# Patient Record
Sex: FEMALE | Race: WHITE | Employment: UNEMPLOYED | URBAN - METROPOLITAN AREA
[De-identification: names, ages, dates, MRNs, and addresses within clinical notes are randomized per-mention and may not be internally consistent; named-entity substitution may affect disease eponyms.]

---

## 2017-01-12 RX ORDER — RANOLAZINE 500 MG/1
TABLET, FILM COATED, EXTENDED RELEASE ORAL
Qty: 60 TAB | Refills: 11 | Status: SHIPPED | OUTPATIENT
Start: 2017-01-12 | End: 2018-01-08 | Stop reason: SDUPTHER

## 2017-01-19 DIAGNOSIS — E10.22 TYPE 1 DIABETES MELLITUS WITH DIABETIC CHRONIC KIDNEY DISEASE, UNSPECIFIED CKD STAGE (HCC): ICD-10-CM

## 2017-01-19 DIAGNOSIS — I25.5 ISCHEMIC CARDIOMYOPATHY: ICD-10-CM

## 2017-01-19 RX ORDER — INSULIN LISPRO 100 [IU]/ML
1 INJECTION, SOLUTION INTRAVENOUS; SUBCUTANEOUS CONTINUOUS
Qty: 6 VIAL | Refills: 5
Start: 2017-01-19

## 2017-01-19 RX ORDER — LISINOPRIL 5 MG/1
5 TABLET ORAL DAILY
Qty: 90 TAB | Refills: 3 | Status: SHIPPED | OUTPATIENT
Start: 2017-01-19 | End: 2017-03-03 | Stop reason: SDUPTHER

## 2017-01-19 RX ORDER — ROSUVASTATIN CALCIUM 10 MG/1
10 TABLET, COATED ORAL
Qty: 90 TAB | Refills: 1 | Status: SHIPPED | OUTPATIENT
Start: 2017-01-19 | End: 2017-03-03 | Stop reason: SDUPTHER

## 2017-01-19 RX ORDER — LANCETS
EACH MISCELLANEOUS
Qty: 1 EACH | Refills: 11 | Status: SHIPPED | OUTPATIENT
Start: 2017-01-19 | End: 2017-08-21

## 2017-01-25 ENCOUNTER — OFFICE VISIT (OUTPATIENT)
Dept: INTERNAL MEDICINE CLINIC | Age: 57
End: 2017-01-25

## 2017-01-25 VITALS
OXYGEN SATURATION: 99 % | HEIGHT: 66 IN | BODY MASS INDEX: 23.56 KG/M2 | DIASTOLIC BLOOD PRESSURE: 80 MMHG | HEART RATE: 88 BPM | WEIGHT: 146.6 LBS | RESPIRATION RATE: 16 BRPM | TEMPERATURE: 97.7 F | SYSTOLIC BLOOD PRESSURE: 156 MMHG

## 2017-01-25 DIAGNOSIS — E03.9 UNSPECIFIED HYPOTHYROIDISM: ICD-10-CM

## 2017-01-25 DIAGNOSIS — E78.5 DYSLIPIDEMIA: ICD-10-CM

## 2017-01-25 DIAGNOSIS — I25.10 CORONARY ARTERY DISEASE INVOLVING NATIVE CORONARY ARTERY OF NATIVE HEART WITHOUT ANGINA PECTORIS: Chronic | ICD-10-CM

## 2017-01-25 DIAGNOSIS — N18.30 TYPE 1 DIABETES MELLITUS WITH STAGE 3 CHRONIC KIDNEY DISEASE (HCC): Primary | Chronic | ICD-10-CM

## 2017-01-25 DIAGNOSIS — E03.4 HYPOTHYROIDISM DUE TO ACQUIRED ATROPHY OF THYROID: ICD-10-CM

## 2017-01-25 DIAGNOSIS — I25.5 ISCHEMIC CARDIOMYOPATHY: ICD-10-CM

## 2017-01-25 DIAGNOSIS — E10.22 TYPE 1 DIABETES MELLITUS WITH STAGE 3 CHRONIC KIDNEY DISEASE (HCC): Primary | Chronic | ICD-10-CM

## 2017-01-25 RX ORDER — LANOLIN ALCOHOL/MO/W.PET/CERES
1000 CREAM (GRAM) TOPICAL DAILY
COMMUNITY
End: 2017-03-23 | Stop reason: ALTCHOICE

## 2017-01-25 RX ORDER — CHOLECALCIFEROL (VITAMIN D3) 125 MCG
CAPSULE ORAL DAILY
COMMUNITY

## 2017-01-25 RX ORDER — LEVOTHYROXINE SODIUM 175 UG/1
175 TABLET ORAL DAILY
Qty: 90 TAB | Refills: 1 | Status: SHIPPED | OUTPATIENT
Start: 2017-01-25 | End: 2017-04-19 | Stop reason: SDUPTHER

## 2017-01-25 NOTE — PROGRESS NOTES
HISTORY OF PRESENT ILLNESS  Mayda Nayak is a 61 y.o. female. HPI     Pt has not been able to see her endocrinologist because she had [employer] and they did not take her insurance. Pt states that she has been able to take all of her medications except Levothyroxine. Her new insurance started at the beginning of this month. Diabetic Review of Systems - medication compliance: compliant all of the time, home glucose monitoring: is performed regularly, further diabetic ROS: no polyuria or polydipsia, no chest pain, dyspnea or TIA's, no numbness, tingling or pain in extremities. Other symptoms and concerns: She has an insulin pump. Pt states that her sugars have been as high as 280 and she adjusts her pump accordingly. Pt states her legs have been numb up to her knees for years and her finger tips are numb when she wakes up in the morning. Her last eye exam was performed in 02/2016 with Dr. Andre Friedman. She is not seeing a kidney specialist.     CAD- pt is followed by Dr. Grayson Mead. Pt states she has occasional chest tightness although she rests and it resolves like cardiologist told her to. Pt will have echo in 03/2017. Pt denies increased swelling in BLE and not waking up short of breath at night. She has not needed to use Nitro. Hypothyroidism:  Lab Results   Component Value Date/Time    TSH 0.19 09/22/2014 05:00 AM   Thyroid ROS: denies fatigue, weight changes, heat/cold intolerance, bowel/skin changes or CVS symptoms. Pt has not been able to take Levothyroxine for the past 6 weeks. Reports she has gained 10 pounds. Hyperlipidemia:  Cardiovascular risk analysis - 61 y.o. female LDL goal is under 100. ROS: taking medications as instructed, no medication side effects noted, no TIA's, no chest pain on exertion, no dyspnea on exertion, no swelling of ankles. Tolerating meds, no myalgias or other side effects noted  New concerns: Stable. Reports she has chronic constipation.  Pt denies bloody or darker colored stools. Review of Systems   All other systems reviewed and are negative. Physical Exam   Constitutional: She is oriented to person, place, and time. She appears well-developed and well-nourished. HENT:   Head: Normocephalic and atraumatic. Right Ear: External ear normal.   Left Ear: External ear normal.   Nose: Nose normal.   Mouth/Throat: Oropharynx is clear and moist.   Eyes: Conjunctivae and EOM are normal.   Neck: Normal range of motion. Neck supple. Carotid bruit is not present. No thyroid mass and no thyromegaly present. Cardiovascular: Normal rate, regular rhythm, normal heart sounds and intact distal pulses. Pulmonary/Chest: Effort normal and breath sounds normal.   Abdominal: Soft. Bowel sounds are normal.   Musculoskeletal: Normal range of motion. Neurological: She is alert and oriented to person, place, and time. She has normal strength. No cranial nerve deficit or sensory deficit. Coordination normal.   Skin: Skin is warm and dry. No abrasion and no rash noted. Psychiatric: She has a normal mood and affect. Her behavior is normal. Judgment and thought content normal.   Nursing note and vitals reviewed. ASSESSMENT and PLAN  Aylin Huang was seen today for medication evaluation. Diagnoses and all orders for this visit:    Type 1 diabetes mellitus with stage 3 chronic kidney disease (HCC)  Stable- Continue current medications.  -     HEMOGLOBIN A1C WITH EAG  -     MICROALBUMIN, UR, RAND W/ MICROALBUMIN/CREA RATIO    Coronary artery disease involving native coronary artery of native heart without angina pectoris  Stable with occasional chest tightness. She has not needed to take Nitro and should continue current medications. Pt is followed by Dr. Unique Silver. Hypothyroidism due to acquired atrophy of thyroid  Pt has been off of Levothyroxine for 6 weeks.  She will begin taking this again and will check labs although will be off due to lack of medicine.   -     T4, FREE  -     TSH 3RD GENERATION    Ischemic cardiomyopathy  -     METABOLIC PANEL, COMPREHENSIVE    Unspecified hypothyroidism  -     levothyroxine (SYNTHROID) 175 mcg tablet; Take 1 Tab by mouth daily. Dyslipidemia  Stable- Continue current medications.  -     LIPID PANEL    Pt had oatmeal for breakfast and turkey sandwich for lunch although will check labs today anyway. lab results and schedule of future lab studies reviewed with patient  reviewed medications and side effects in detail     Written by Darryle Blood, as dictated by June Toledo MD.     Current diagnosis and concerns discussed with pt at length. Understands risks and benefits or current treatment plan and medications and accepts the treatment and medication with any possible risks. Pt asks appropriate questions which were answered. Pt instructed to call with any concerns or problems.

## 2017-01-26 LAB
ALBUMIN SERPL-MCNC: 4.6 G/DL (ref 3.5–5.5)
ALBUMIN/CREAT UR: 1147.5 MG/G CREAT (ref 0–30)
ALBUMIN/GLOB SERPL: 1.7 {RATIO} (ref 1.1–2.5)
ALP SERPL-CCNC: 106 IU/L (ref 39–117)
ALT SERPL-CCNC: 22 IU/L (ref 0–32)
AST SERPL-CCNC: 27 IU/L (ref 0–40)
BILIRUB SERPL-MCNC: 0.3 MG/DL (ref 0–1.2)
BUN SERPL-MCNC: 42 MG/DL (ref 6–24)
BUN/CREAT SERPL: 29 (ref 9–23)
CALCIUM SERPL-MCNC: 9.6 MG/DL (ref 8.7–10.2)
CHLORIDE SERPL-SCNC: 98 MMOL/L (ref 96–106)
CHOLEST SERPL-MCNC: 421 MG/DL (ref 100–199)
CO2 SERPL-SCNC: 23 MMOL/L (ref 18–29)
COMMENT, 011824: ABNORMAL
CREAT SERPL-MCNC: 1.45 MG/DL (ref 0.57–1)
CREAT UR-MCNC: 61.1 MG/DL
EST. AVERAGE GLUCOSE BLD GHB EST-MCNC: 258 MG/DL
GLOBULIN SER CALC-MCNC: 2.7 G/DL (ref 1.5–4.5)
GLUCOSE SERPL-MCNC: 124 MG/DL (ref 65–99)
HBA1C MFR BLD: 10.6 % (ref 4.8–5.6)
HDLC SERPL-MCNC: 117 MG/DL
INTERPRETATION, 910389: NORMAL
INTERPRETATION: NORMAL
LDLC SERPL CALC-MCNC: 283 MG/DL (ref 0–99)
MICROALBUMIN UR-MCNC: 701.1 UG/ML
POTASSIUM SERPL-SCNC: 4.6 MMOL/L (ref 3.5–5.2)
PROT SERPL-MCNC: 7.3 G/DL (ref 6–8.5)
SODIUM SERPL-SCNC: 139 MMOL/L (ref 134–144)
T4 FREE SERPL-MCNC: 1.56 NG/DL (ref 0.82–1.77)
TRIGL SERPL-MCNC: 106 MG/DL (ref 0–149)
TSH SERPL DL<=0.005 MIU/L-ACNC: 6.3 UIU/ML (ref 0.45–4.5)
VLDLC SERPL CALC-MCNC: 21 MG/DL (ref 5–40)

## 2017-01-31 RX ORDER — BUPROPION HYDROCHLORIDE 100 MG/1
TABLET ORAL
Qty: 60 TAB | Refills: 1 | Status: SHIPPED | OUTPATIENT
Start: 2017-01-31 | End: 2017-03-23 | Stop reason: SDUPTHER

## 2017-02-13 ENCOUNTER — TELEPHONE (OUTPATIENT)
Dept: CARDIOLOGY CLINIC | Age: 57
End: 2017-02-13

## 2017-02-13 NOTE — TELEPHONE ENCOUNTER
MD Heather Ritter MD; Nicky Finney LPN                     Wow! Andre Hauser She needs PCSK9 inhibitor. See she has appt in late March. Will see if we can accelerate this     Maria A Xiomykaren, can we get her sooner to discuss lipids? Appointment scheduled for 2/15/17.

## 2017-02-15 ENCOUNTER — OFFICE VISIT (OUTPATIENT)
Dept: CARDIOLOGY CLINIC | Age: 57
End: 2017-02-15

## 2017-02-15 VITALS
RESPIRATION RATE: 18 BRPM | SYSTOLIC BLOOD PRESSURE: 168 MMHG | BODY MASS INDEX: 22.92 KG/M2 | DIASTOLIC BLOOD PRESSURE: 82 MMHG | OXYGEN SATURATION: 98 % | HEIGHT: 66 IN | HEART RATE: 103 BPM | WEIGHT: 142.6 LBS

## 2017-02-15 DIAGNOSIS — I73.9 PAD (PERIPHERAL ARTERY DISEASE) (HCC): Chronic | ICD-10-CM

## 2017-02-15 DIAGNOSIS — I25.5 ISCHEMIC CARDIOMYOPATHY: ICD-10-CM

## 2017-02-15 DIAGNOSIS — I25.10 CORONARY ARTERY DISEASE INVOLVING NATIVE CORONARY ARTERY OF NATIVE HEART WITHOUT ANGINA PECTORIS: Primary | Chronic | ICD-10-CM

## 2017-02-15 DIAGNOSIS — D50.9 IRON DEFICIENCY ANEMIA, UNSPECIFIED IRON DEFICIENCY ANEMIA TYPE: ICD-10-CM

## 2017-02-15 DIAGNOSIS — N18.30 TYPE 1 DIABETES MELLITUS WITH STAGE 3 CHRONIC KIDNEY DISEASE (HCC): Chronic | ICD-10-CM

## 2017-02-15 DIAGNOSIS — E10.22 TYPE 1 DIABETES MELLITUS WITH STAGE 3 CHRONIC KIDNEY DISEASE (HCC): Chronic | ICD-10-CM

## 2017-02-15 DIAGNOSIS — N18.30 CKD (CHRONIC KIDNEY DISEASE), STAGE III (HCC): Chronic | ICD-10-CM

## 2017-02-15 DIAGNOSIS — E03.4 HYPOTHYROIDISM DUE TO ACQUIRED ATROPHY OF THYROID: ICD-10-CM

## 2017-02-15 DIAGNOSIS — E78.5 DYSLIPIDEMIA: ICD-10-CM

## 2017-02-15 DIAGNOSIS — J44.9 CHRONIC OBSTRUCTIVE PULMONARY DISEASE, UNSPECIFIED COPD TYPE (HCC): Chronic | ICD-10-CM

## 2017-02-15 NOTE — PROGRESS NOTES
History of Present Illness  Lavinia Taylor is a 61 y.o. female. Last seen 5 months ago. Here today to review recent lipid studies. Problem List  Date Reviewed: 1/25/2017          Codes Class Noted    Dyslipidemia ICD-10-CM: E78.5  ICD-9-CM: 272.4  5/31/2016        Hypothyroidism ICD-10-CM: E03.9  ICD-9-CM: 244.9  2/23/2016        Ischemic cardiomyopathy ICD-10-CM: I25.5  ICD-9-CM: 414.8  11/2/2014        CAD (coronary artery disease), native coronary artery (Chronic) ICD-10-CM: I25.10  ICD-9-CM: 414.01  11/2/2014        PAD (peripheral artery disease) (HCC) (Chronic) ICD-10-CM: I73.9  ICD-9-CM: 443.9  10/6/2014        DM type 1 causing renal disease (HCC) (Chronic) ICD-10-CM: E10.29  ICD-9-CM: 250.41  10/6/2014        CKD (chronic kidney disease), stage III (Chronic) ICD-10-CM: N18.3  ICD-9-CM: 585.3  9/25/2014        Anemia, iron deficiency ICD-10-CM: D50.9  ICD-9-CM: 280.9  9/25/2014        COPD (chronic obstructive pulmonary disease) (HCC) (Chronic) ICD-10-CM: J44.9  ICD-9-CM: 358  12/17/2009            Cardiac Testing  ECHO: 9/22/2014: EF 40%, mild diffuse HK, mild MR, TR   Cath 10/14/2014 - EDP 3, no AV gradient, LVEF 40-45%, inferior AK, mod cor Ca2+, LM mild plaque, LAD mild prox plaque, apical 85%, D1 diffuse 75%, LCX mid 90% involving distal OM, RCA prox 100% with L to R collaterals  Carotid duplex 11/5/14 - 0-9% left, 10-49% right  Angio 11/19/14 - patent stents on right with 3 vessel runoff, left mid SFA occlusion with 1 vessel runoff   11/23/15 Echo - akinesis of inferior and inferolateral walls, EF 30-35%  11/23/15 Carotid duplex - 0-9% right internal carotid, 10-49% left internal carotid. No significant change since 11/2014. Echo 2/19/16 - EF 40%. with mild lateral hypokinesis and severe posterior/inferior hypokinesis. Grade 1 diastolic dysfunction. Mild LAE. [facility] 2/22/16 - Proximal LAD 40%, Mid LAD 50%, Distal LAD 90%. HPI  Ms. Lino Zhang denies any significant interval issues.   She notes compliance with all medication, except a 6 week hiatus from LTX due to insurance issues, for which she has now resumed. Occasionally will miss medications due to vomiting in the setting of gastroparesis. She denies any exertional anginal symptoms on current Rx of Ranexa. Notes intermittent issues with chest \"pain\" while laying down for bed. Resolves with sitting up. She denies any dyspnea. Chronic fatigue continues to limit her activities during the day. She continues to wear an insulin pump and denies any recent dietary changes, insulin dose changes or other illnesses. She is on the DASH diet due to gastroparesis. She denies any  palpitations, syncope, orthopnea, edema or paroxysmal nocturnal dyspnea. Current Outpatient Prescriptions   Medication Sig    buPROPion (WELLBUTRIN) 100 mg tablet TAKE ONE TABLET BY MOUTH TWO TIMES A DAY    cyanocobalamin (VITAMIN B-12) 1,000 mcg tablet Take 1,000 mcg by mouth daily.  cholecalciferol, vitamin D3, (VITAMIN D3) 2,000 unit tab Take  by mouth.  levothyroxine (SYNTHROID) 175 mcg tablet Take 1 Tab by mouth daily.  lisinopril (PRINIVIL, ZESTRIL) 5 mg tablet Take 1 Tab by mouth daily.  rosuvastatin (CRESTOR) 10 mg tablet Take 1 Tab by mouth nightly.  insulin lispro (HUMALOG) 100 unit/mL injection 1 Units by SubCUTAneous route continuous. To use for insulin pump.  RANEXA 500 mg SR tablet TAKE 1 TABLET BY MOUTH TWO TIMES A DAY    carvedilol (COREG) 6.25 mg tablet TAKE 1 TABLET BY MOUTH EVERY MORNING & TAKE 2 TABLETS BY MOUTH EVERY EVENING    nitroglycerin (NITROSTAT) 0.4 mg SL tablet 1 Tab by SubLINGual route every five (5) minutes as needed for Chest Pain. Indications: ANGINA    aspirin 81 mg chewable tablet Take 81 mg by mouth daily.   insulin pump (PATIENT SUPPLIED) misc as needed. Humalog insulin pump basal + bolus    Lancets misc To check daily blood sugars.     glucose blood VI test strips (ASCENSIA CONTOUR) strip To test daily blood sugars. No current facility-administered medications for this visit. Social History     Social History    Marital status:      Spouse name: N/A    Number of children: N/A    Years of education: N/A     Occupational History    Not on file. Social History Main Topics    Smoking status: Former Smoker     Packs/day: 0.50     Years: 35.00     Types: Cigarettes     Quit date: 9/21/2014    Smokeless tobacco: Never Used    Alcohol use No    Drug use: No    Sexual activity: No     Other Topics Concern    Not on file     Social History Narrative     Review of Systems  Constitutional: Negative for fever, chills, and diaphoresis. Positive for fatigue. Respiratory: Negative for cough, hemoptysis, sputum production, shortness of breath and wheezing. Cardiovascular: Negative for chest pain, palpitations, orthopnea, claudication, leg swelling and PND. Gastrointestinal: Negative for heartburn, blood in stool and melena. Positive for nausea, vomiting. Genitourinary: Negative for dysuria and flank pain. Musculoskeletal: Negative for joint pain and back pain. Skin: Negative for rash. Neurological: Negative for focal weakness, seizures, loss of consciousness, weakness and headaches. Endo/Heme/Allergies: Does not bruise/bleed easily. Psychiatric/Behavioral: Negative for memory loss. Positive for poor sleep quality.      Visit Vitals    /82 (BP 1 Location: Left arm, BP Patient Position: Sitting)    Pulse (!) 103    Resp 18    Ht 5' 6\" (1.676 m)    Wt 142 lb 9.6 oz (64.7 kg)    SpO2 98%    BMI 23.02 kg/m2     Wt Readings from Last 3 Encounters:   02/15/17 142 lb 9.6 oz (64.7 kg)   01/25/17 146 lb 9.6 oz (66.5 kg)   09/27/16 137 lb (62.1 kg)     Lab Results   Component Value Date/Time    Cholesterol, total 421 01/25/2017 03:09 AM    HDL Cholesterol 117 01/25/2017 03:09 AM    LDL, calculated 283 01/25/2017 03:09 AM    VLDL, calculated 21 01/25/2017 03:09 AM    Triglyceride 106 01/25/2017 03:09 AM    CHOL/HDL Ratio 2.4 02/20/2016 08:25 AM       Physical Exam  General - well developed well nourished in mild distress. Neck - JVP normal, thyroid normal  Cardiac - normal S1,S2, no murmurs, rubs or gallops. No clicks  Vascular - carotids without bruits, radials, femorals and pedal pulses equal bilateral  Lungs - clear to auscultation bilaterals, no rales, wheezing or rhonchi  Abd - soft nontender, no HSM, no abd bruits  Extremities - no edema, warm. Skin - no rash  Neuro - nonfocal  Psych - normal mood and affect    Cardiographics  EKG 10/6/14 - Sinus at 90 bpm, nonspecific ST changes  EKG 10/30/14 - sinus, normal EKG  EKG 2/15/17 - ST     ASSESSMENT and PLAN  Encounter Diagnoses   Name Primary?  Coronary artery disease involving native coronary artery of native heart without angina pectoris Yes    Ischemic cardiomyopathy     PAD (peripheral artery disease) ([facility]     Hypothyroidism due to acquired atrophy of thyroid     Type 1 diabetes mellitus with stage 3 chronic kidney disease (HCC)     Chronic obstructive pulmonary disease, unspecified COPD type ([address]     CKD (chronic kidney disease), stage III     Iron deficiency anemia, unspecified iron deficiency anemia type     Dyslipidemia      Ms. Sheila Ricks has diffuse atherosclerosis involving her coronary, carotid, and lower extremity circulation in the setting of T1DM. She has multivessel CAD with NSTEMI 2/19/16. She is felt to be best suited for continued medical therapy. She has derived benefit from Ranexa 1000 mg BID. Plan to continue her current regimen. Recent repeat lipid panel shows drastic fluctuation of TC, LDL and HDL, suspicious for laboratory error. Plan to repeat lipid panel again today. Continue current statin dose. Phone follow up to review results. Mild LV dysfunction by Echo 2/19/16, EF 40%. No HF sxs on no diuretic regimen. Plan to reassess EF by  Echo in 1 month, as previously scheduled.   Phone follow up to review results. Chronic left leg claudication in the setting of diffuse PAD involving the left iliac circulation. Continue medical therapy. I encouraged her to stay smoke free and to exercise regularly. The patient was encouraged to continue current medications, exercise as she's able and call with any new complaints or concerns. Follow-up Disposition:  Return in about 6 months (around 8/15/2017).      TRISHA Hernández MD

## 2017-02-15 NOTE — MR AVS SNAPSHOT
Visit Information Date & Time Provider Department Dept. Phone Encounter #  
 2/15/2017 10:40 AM Jair Parkinson MD [facility] Gretta Scales [phone] [id number] Follow-up Instructions Return in about 6 months (around 8/15/2017). Your Appointments 3/28/2017  3:00 PM  
ECHO CARDIOGRAMS 2D with ALINE SOUZA  
[facility] ([address]) Appt Note: echo at 3pm 6mo fu dr Vandana Silva [address] [facility]  
[phone]  
  
   
 [address] [facility] [address]  
  
    
 8/21/2017  9:40 AM  
ESTABLISHED PATIENT with Jair Parkinson MD  
[facility] ([address]) Appt Note: echo at 3pm 6mo fu dr Vandana Silva; [address] [facility]  
[address] [address] [address] Upcoming Health Maintenance Date Due Pneumococcal 19-64 Highest Risk (1 of 3 - PCV13) 7/20/1978 FOBT Q 1 YEAR AGE 50-75 9/25/2015 EYE EXAM RETINAL OR DILATED Q1 10/6/2016 BREAST CANCER SCRN MAMMOGRAM 5/21/2017 HEMOGLOBIN A1C Q6M 7/25/2017 FOOT EXAM Q1 1/25/2018 MICROALBUMIN Q1 1/25/2018 LIPID PANEL Q1 1/25/2018 PAP AKA CERVICAL CYTOLOGY 8/6/2018 DTaP/Tdap/Td series (2 - Td) 7/30/2023 Allergies as of 2/15/2017  Review Complete On: 2/15/2017 By: Milena Villarreal NP Severity Noted Reaction Type Reactions Percocet [Oxycodone-acetaminophen]  06/28/2015    Nausea Only ++ Hydrocodone (Norco/vicodin), Morphine, Hydromorphone (Dialudid) are okay per patient ++ Current Immunizations  Reviewed on 10/22/2014 Name Date Influenza Vaccine 11/10/2016, 10/1/2015 Tdap 7/30/2013  8:54 PM  
  
 Not reviewed this visit You Were Diagnosed With   
  
 Codes Comments Coronary artery disease involving native coronary artery of native heart without angina pectoris    -  Primary ICD-10-CM: I25.10 ICD-9-CM: 414.01 Ischemic cardiomyopathy     ICD-10-CM: I25.5 ICD-9-CM: 414.8 PAD (peripheral artery disease) (HCC)     ICD-10-CM: I73.9 ICD-9-CM: 443.9 Hypothyroidism due to acquired atrophy of thyroid     ICD-10-CM: E03.4 ICD-9-CM: 244.8, 246.8 Type 1 diabetes mellitus with stage 3 chronic kidney disease (HCC)     ICD-10-CM: E10.22, N18.3 ICD-9-CM: 250.41, 585.3 Chronic obstructive pulmonary disease, unspecified COPD type ([facility]     ICD-10-CM: J44.9 ICD-9-CM: 441 CKD (chronic kidney disease), stage III     ICD-10-CM: N18.3 ICD-9-CM: 578. 3 Iron deficiency anemia, unspecified iron deficiency anemia type     ICD-10-CM: D50.9 ICD-9-CM: 280.9 Dyslipidemia     ICD-10-CM: E78.5 ICD-9-CM: 272.4 Vitals BP Pulse Resp Height(growth percentile) Weight(growth percentile) SpO2  
 168/82 (BP 1 Location: Left arm, BP Patient Position: Sitting) (!) 103 18 5' 6\" (1.676 m) 142 lb 9.6 oz (64.7 kg) 98% BMI OB Status Smoking Status 23.02 kg/m2 Postmenopausal Former Smoker BMI and BSA Data Body Mass Index Body Surface Area 23.02 kg/m 2 1.74 m 2 Preferred Pharmacy Pharmacy Name Phone [address] [facility] [phone] Your Updated Medication List  
  
   
This list is accurate as of: 2/15/17 11:03 AM.  Always use your most recent med list.  
  
  
  
  
  insulin pump Misc Commonly known as:  PATIENT SUPPLIED  
as needed. Humalog insulin pump basal + bolus  
  
 aspirin 81 mg chewable tablet Take 81 mg by mouth daily. buPROPion 100 mg tablet Commonly known as:  WELLBUTRIN  
TAKE ONE TABLET BY MOUTH TWO TIMES A DAY  
  
 carvedilol 6.25 mg tablet Commonly known as:  COREG  
TAKE 1 TABLET BY MOUTH EVERY MORNING & TAKE 2 TABLETS BY MOUTH EVERY EVENING  
  
 glucose blood VI test strips strip Commonly known as:  Ascensia CONTOUR To test daily blood sugars. insulin lispro 100 unit/mL injection Commonly known as:  HumaLOG  
1 Units by SubCUTAneous route continuous. To use for insulin pump. Lancets Misc To check daily blood sugars. levothyroxine 175 mcg tablet Commonly known as:  SYNTHROID Take 1 Tab by mouth daily. lisinopril 5 mg tablet Commonly known as:  Joseph Bridges Take 1 Tab by mouth daily. nitroglycerin 0.4 mg SL tablet Commonly known as:  NITROSTAT  
1 Tab by SubLINGual route every five (5) minutes as needed for Chest Pain. Indications: ANGINA  
  
 RANEXA 500 mg SR tablet Generic drug:  ranolazine ER  
TAKE 1 TABLET BY MOUTH TWO TIMES A DAY  
  
 rosuvastatin 10 mg tablet Commonly known as:  CRESTOR Take 1 Tab by mouth nightly. VITAMIN B-12 1,000 mcg tablet Generic drug:  cyanocobalamin Take 1,000 mcg by mouth daily. VITAMIN D3 2,000 unit Tab Generic drug:  cholecalciferol (vitamin D3) Take  by mouth. We Performed the Following AMB POC EKG ROUTINE W/ 12 LEADS, INTER & REP [59324 CPT(R)] LIPID PANEL [65412 CPT(R)] Follow-up Instructions Return in about 6 months (around 8/15/2017). Patient Instructions Continue your current medication regimen. I will call you once your lab work is back. Remain observant about your chest pain type symptoms and call me with any questions or concerns. Introducing [facility]! Dear David Marie: Thank you for requesting a [employer] account. Our records indicate that you have previously registered for a [employer] account but its currently inactive. Please call our [employer] support line at [phone]. Additional Information If you have questions, please visit the Frequently Asked Questions section of the [employer] website at [email or web address] Remember, MyChart is NOT to be used for urgent needs. For medical emergencies, dial 911. Now available from your iPhone and Android! Please provide this summary of care documentation to your next provider. Your primary care clinician is listed as Emiliano Rosenthal. If you have any questions after today's visit, please call [phone].

## 2017-02-15 NOTE — PATIENT INSTRUCTIONS
Continue your current medication regimen. I will call you once your lab work is back. Remain observant about your chest pain type symptoms and call me with any questions or concerns.

## 2017-02-16 ENCOUNTER — TELEPHONE (OUTPATIENT)
Dept: INTERNAL MEDICINE CLINIC | Age: 57
End: 2017-02-16

## 2017-02-16 ENCOUNTER — APPOINTMENT (OUTPATIENT)
Dept: GENERAL RADIOLOGY | Age: 57
End: 2017-02-16
Attending: EMERGENCY MEDICINE
Payer: COMMERCIAL

## 2017-02-16 ENCOUNTER — HOSPITAL ENCOUNTER (EMERGENCY)
Age: 57
Discharge: HOME | End: 2017-02-17
Attending: EMERGENCY MEDICINE
Payer: COMMERCIAL

## 2017-02-16 DIAGNOSIS — R11.2 NON-INTRACTABLE VOMITING WITH NAUSEA, UNSPECIFIED VOMITING TYPE: Primary | ICD-10-CM

## 2017-02-16 DIAGNOSIS — R10.84 ABDOMINAL PAIN, GENERALIZED: ICD-10-CM

## 2017-02-16 DIAGNOSIS — K31.84 GASTROPARESIS: ICD-10-CM

## 2017-02-16 LAB
ALBUMIN SERPL BCP-MCNC: 4.4 G/DL (ref 3.5–5)
ALBUMIN/GLOB SERPL: 1.1 {RATIO} (ref 1.1–2.2)
ALP SERPL-CCNC: 93 U/L (ref 45–117)
ALT SERPL-CCNC: 27 U/L (ref 12–78)
ANION GAP BLD CALC-SCNC: 19 MMOL/L (ref 5–15)
APPEARANCE UR: CLEAR
AST SERPL W P-5'-P-CCNC: 21 U/L (ref 15–37)
BACTERIA URNS QL MICRO: NEGATIVE /HPF
BASOPHILS # BLD AUTO: 0 K/UL (ref 0–0.1)
BASOPHILS # BLD: 0 % (ref 0–1)
BILIRUB SERPL-MCNC: 0.5 MG/DL (ref 0.2–1)
BILIRUB UR QL CFM: NEGATIVE
BUN SERPL-MCNC: 58 MG/DL (ref 6–20)
BUN/CREAT SERPL: 35 (ref 12–20)
CALCIUM SERPL-MCNC: 9.3 MG/DL (ref 8.5–10.1)
CHLORIDE SERPL-SCNC: 101 MMOL/L (ref 97–108)
CHOLEST SERPL-MCNC: 242 MG/DL (ref 100–199)
CO2 SERPL-SCNC: 18 MMOL/L (ref 21–32)
COLOR UR: ABNORMAL
CREAT SERPL-MCNC: 1.68 MG/DL (ref 0.55–1.02)
EOSINOPHIL # BLD: 0 K/UL (ref 0–0.4)
EOSINOPHIL NFR BLD: 0 % (ref 0–7)
EPITH CASTS URNS QL MICRO: ABNORMAL /LPF
ERYTHROCYTE [DISTWIDTH] IN BLOOD BY AUTOMATED COUNT: 15.1 % (ref 11.5–14.5)
GLOBULIN SER CALC-MCNC: 3.9 G/DL (ref 2–4)
GLUCOSE BLD STRIP.AUTO-MCNC: 161 MG/DL (ref 65–100)
GLUCOSE SERPL-MCNC: 169 MG/DL (ref 65–100)
GLUCOSE UR STRIP.AUTO-MCNC: NEGATIVE MG/DL
HCT VFR BLD AUTO: 41.3 % (ref 35–47)
HDLC SERPL-MCNC: 91 MG/DL
HGB BLD-MCNC: 13.9 G/DL (ref 11.5–16)
HGB UR QL STRIP: ABNORMAL
HYALINE CASTS URNS QL MICRO: ABNORMAL /LPF (ref 0–5)
INTERPRETATION, 910389: NORMAL
INTERPRETATION: NORMAL
KETONES UR QL STRIP.AUTO: 15 MG/DL
LDLC SERPL CALC-MCNC: 128 MG/DL (ref 0–99)
LEUKOCYTE ESTERASE UR QL STRIP.AUTO: ABNORMAL
LIPASE SERPL-CCNC: 164 U/L (ref 73–393)
LYMPHOCYTES # BLD AUTO: 10 % (ref 12–49)
LYMPHOCYTES # BLD: 1.2 K/UL (ref 0.8–3.5)
MCH RBC QN AUTO: 29.9 PG (ref 26–34)
MCHC RBC AUTO-ENTMCNC: 33.7 G/DL (ref 30–36.5)
MCV RBC AUTO: 88.8 FL (ref 80–99)
MONOCYTES # BLD: 0.7 K/UL (ref 0–1)
MONOCYTES NFR BLD AUTO: 6 % (ref 5–13)
NEUTS SEG # BLD: 9.9 K/UL (ref 1.8–8)
NEUTS SEG NFR BLD AUTO: 84 % (ref 32–75)
NITRITE UR QL STRIP.AUTO: NEGATIVE
PDF IMAGE, 910387: NORMAL
PH UR STRIP: 5 [PH] (ref 5–8)
PLATELET # BLD AUTO: 213 K/UL (ref 150–400)
POTASSIUM SERPL-SCNC: 4.5 MMOL/L (ref 3.5–5.1)
PROT SERPL-MCNC: 8.3 G/DL (ref 6.4–8.2)
PROT UR STRIP-MCNC: 100 MG/DL
RBC # BLD AUTO: 4.65 M/UL (ref 3.8–5.2)
RBC #/AREA URNS HPF: ABNORMAL /HPF (ref 0–5)
SERVICE CMNT-IMP: ABNORMAL
SODIUM SERPL-SCNC: 138 MMOL/L (ref 136–145)
SP GR UR REFRACTOMETRY: 1.02 (ref 1–1.03)
TRIGL SERPL-MCNC: 116 MG/DL (ref 0–149)
UA: UC IF INDICATED,UAUC: ABNORMAL
UROBILINOGEN UR QL STRIP.AUTO: 0.2 EU/DL (ref 0.2–1)
VLDLC SERPL CALC-MCNC: 23 MG/DL (ref 5–40)
WBC # BLD AUTO: 11.8 K/UL (ref 3.6–11)
WBC URNS QL MICRO: ABNORMAL /HPF (ref 0–4)

## 2017-02-16 PROCEDURE — 84484 ASSAY OF TROPONIN QUANT: CPT | Performed by: EMERGENCY MEDICINE

## 2017-02-16 PROCEDURE — 36415 COLL VENOUS BLD VENIPUNCTURE: CPT | Performed by: EMERGENCY MEDICINE

## 2017-02-16 PROCEDURE — 96375 TX/PRO/DX INJ NEW DRUG ADDON: CPT

## 2017-02-16 PROCEDURE — 74011250636 HC RX REV CODE- 250/636: Performed by: EMERGENCY MEDICINE

## 2017-02-16 PROCEDURE — 99285 EMERGENCY DEPT VISIT HI MDM: CPT

## 2017-02-16 PROCEDURE — 85025 COMPLETE CBC W/AUTO DIFF WBC: CPT | Performed by: EMERGENCY MEDICINE

## 2017-02-16 PROCEDURE — 82962 GLUCOSE BLOOD TEST: CPT

## 2017-02-16 PROCEDURE — 81001 URINALYSIS AUTO W/SCOPE: CPT | Performed by: EMERGENCY MEDICINE

## 2017-02-16 PROCEDURE — 83690 ASSAY OF LIPASE: CPT | Performed by: EMERGENCY MEDICINE

## 2017-02-16 PROCEDURE — 80053 COMPREHEN METABOLIC PANEL: CPT | Performed by: EMERGENCY MEDICINE

## 2017-02-16 PROCEDURE — 93005 ELECTROCARDIOGRAM TRACING: CPT

## 2017-02-16 PROCEDURE — 96361 HYDRATE IV INFUSION ADD-ON: CPT

## 2017-02-16 PROCEDURE — 74022 RADEX COMPL AQT ABD SERIES: CPT

## 2017-02-16 PROCEDURE — 80048 BASIC METABOLIC PNL TOTAL CA: CPT | Performed by: EMERGENCY MEDICINE

## 2017-02-16 PROCEDURE — 96374 THER/PROPH/DIAG INJ IV PUSH: CPT

## 2017-02-16 RX ORDER — DIPHENHYDRAMINE HYDROCHLORIDE 50 MG/ML
25 INJECTION, SOLUTION INTRAMUSCULAR; INTRAVENOUS
Status: COMPLETED | OUTPATIENT
Start: 2017-02-16 | End: 2017-02-16

## 2017-02-16 RX ORDER — PROCHLORPERAZINE EDISYLATE 5 MG/ML
10 INJECTION INTRAMUSCULAR; INTRAVENOUS
Status: DISCONTINUED | OUTPATIENT
Start: 2017-02-16 | End: 2017-02-17 | Stop reason: HOSPADM

## 2017-02-16 RX ORDER — PROCHLORPERAZINE EDISYLATE 5 MG/ML
10 INJECTION INTRAMUSCULAR; INTRAVENOUS
Status: COMPLETED | OUTPATIENT
Start: 2017-02-16 | End: 2017-02-16

## 2017-02-16 RX ORDER — ONDANSETRON 2 MG/ML
4 INJECTION INTRAMUSCULAR; INTRAVENOUS
Status: COMPLETED | OUTPATIENT
Start: 2017-02-16 | End: 2017-02-16

## 2017-02-16 RX ADMIN — ONDANSETRON 4 MG: 2 INJECTION INTRAMUSCULAR; INTRAVENOUS at 23:33

## 2017-02-16 RX ADMIN — DIPHENHYDRAMINE HYDROCHLORIDE 25 MG: 50 INJECTION, SOLUTION INTRAMUSCULAR; INTRAVENOUS at 22:17

## 2017-02-16 RX ADMIN — PROCHLORPERAZINE EDISYLATE 10 MG: 5 INJECTION INTRAMUSCULAR; INTRAVENOUS at 22:16

## 2017-02-16 RX ADMIN — SODIUM CHLORIDE 1000 ML: 900 INJECTION, SOLUTION INTRAVENOUS at 22:17

## 2017-02-16 NOTE — TELEPHONE ENCOUNTER
Patient would like a return call on whether she needs to go to the ER or see Dr. Jayant Moore. Blood Sugar is 128 and she says she is vomiting.   Call her back at [phone]

## 2017-02-16 NOTE — TELEPHONE ENCOUNTER
Contacted pt who is advising that the vomiting as stopped, she had it all day yesterday but stopped about hour-hour in a half ago. Inquired if she was keeping any fluids down. Pt advised she had not but until recently. Questioned most recent blood sugar reading. Most recent was 127. She is reluctant to go to ED. Advised that visit to ED was recommended by physician doesn't want to risk being dehydrated or developing more issues. Working on fluid intake if she feels dry, sugars elevate, vomiting returns to be seen in ED. Pt states she will see how she feels in about an hours if no better or vomiting starts she will be seen in ED.

## 2017-02-16 NOTE — ED TRIAGE NOTES
Pt c/o vomiting since yesterday morning. BS in 200's yesterday, but in 100's today. Not keeping anything down, feels dehydrated. No diarrhea, has constipation. No known sick contacts with GI sx. Pt not able to keep any meds down.

## 2017-02-17 VITALS
BODY MASS INDEX: 22.34 KG/M2 | TEMPERATURE: 97.9 F | DIASTOLIC BLOOD PRESSURE: 41 MMHG | WEIGHT: 139 LBS | SYSTOLIC BLOOD PRESSURE: 116 MMHG | OXYGEN SATURATION: 96 % | RESPIRATION RATE: 18 BRPM | HEART RATE: 90 BPM | HEIGHT: 66 IN

## 2017-02-17 LAB
ANION GAP BLD CALC-SCNC: 10 MMOL/L (ref 5–15)
ATRIAL RATE: 101 BPM
BUN SERPL-MCNC: 51 MG/DL (ref 6–20)
BUN/CREAT SERPL: 35 (ref 12–20)
CALCIUM SERPL-MCNC: 7.8 MG/DL (ref 8.5–10.1)
CALCULATED P AXIS, ECG09: 66 DEGREES
CALCULATED R AXIS, ECG10: 70 DEGREES
CALCULATED T AXIS, ECG11: 77 DEGREES
CHLORIDE SERPL-SCNC: 108 MMOL/L (ref 97–108)
CO2 SERPL-SCNC: 23 MMOL/L (ref 21–32)
CREAT SERPL-MCNC: 1.44 MG/DL (ref 0.55–1.02)
DIAGNOSIS, 93000: NORMAL
GLUCOSE SERPL-MCNC: 120 MG/DL (ref 65–100)
P-R INTERVAL, ECG05: 178 MS
POTASSIUM SERPL-SCNC: 4 MMOL/L (ref 3.5–5.1)
Q-T INTERVAL, ECG07: 356 MS
QRS DURATION, ECG06: 80 MS
QTC CALCULATION (BEZET), ECG08: 461 MS
SODIUM SERPL-SCNC: 141 MMOL/L (ref 136–145)
TROPONIN I SERPL-MCNC: <0.04 NG/ML
VENTRICULAR RATE, ECG03: 101 BPM

## 2017-02-17 PROCEDURE — 80048 BASIC METABOLIC PNL TOTAL CA: CPT | Performed by: EMERGENCY MEDICINE

## 2017-02-17 PROCEDURE — 74011250636 HC RX REV CODE- 250/636: Performed by: EMERGENCY MEDICINE

## 2017-02-17 RX ORDER — METOCLOPRAMIDE 10 MG/1
10 TABLET ORAL
Qty: 12 TAB | Refills: 0 | Status: SHIPPED | OUTPATIENT
Start: 2017-02-17 | End: 2017-02-21 | Stop reason: SDUPTHER

## 2017-02-17 RX ADMIN — SODIUM CHLORIDE 1000 ML: 900 INJECTION, SOLUTION INTRAVENOUS at 00:27

## 2017-02-17 NOTE — ED PROVIDER NOTES
HPI Comments: 61 y.o. female with extensive past medical history, please see list, significant for COPD, HTN, DM , NSTEMI, CKD, CAD, gastric pacemaker,  and multiple abdominal surgeries who presents from home with chief complaint of vomiting. Pt states that starting yesterday morning she began vomiting, and then started experiencing chest pressure and then developed abdominal pain. Pt notes that when she first started vomiting, she was vomiting a yellow substance but claims that she is currently vomiting  brown. Pt says that she has taken 2 Zofran at home with some relief. Pt reports that she has a hx of gastric ulcers and gastroparesis. Pt claims that her current sxs do not feel like prior episodes of ulcers or gastroparesis and says that she feels as if she has the flu. Pt also notes that she has some sinus drainage as well. Pt states that her blood glucose level was 127 at 1300 today. Pt reports that she is currently constipated and has not had a BM in the past week, although the pt says that it is normal for her not to have a BM for 2 weeks at a time. Pt's last gastric ulcer was reportedly 1 year ago. Pt denies seeing blood in her vomiting. Pt denies fever, productive cough or sick contacts. There are no other acute medical concerns at this time. PCP: Justice Tracey MD    Note written by Farrah Garcia, as dictated by Nilsa Canchola MD 8:54 PM      The history is provided by the patient. No  was used.         Past Medical History:   Diagnosis Date    (HFpEF) heart failure with preserved ejection fraction ([address] 10/6/2014    CAD (coronary artery disease), native coronary artery 2014    Carotid artery disease ([address]     Carotid artery occlusion and stenosis 10/6/2014    CKD (chronic kidney disease), stage III 2014    COPD (chronic obstructive pulmonary disease) ([address] 2009    DKA (diabetic ketoacidoses) ([address] 2014    DM [facility]diabetes mellitus[facility] 1 [facility]     DM type 1 causing renal disease ([facility] 10/6/2014    Dyslipidemia 2009    Hypertension     Hypothyroid     Hypothyroidism 2016    Ischemic cardiomyopathy 2014    Nicotine dependence     NSTEMI (non-ST elevated myocardial infarction) ([facility] 2014    PAD (peripheral artery disease) ([facility] 10/6/2014    Pneumonia        Past Surgical History:   Procedure Laterality Date    Hx gi       gastric pacemaker    Hx gyn        x2    Hx tonsil and adenoidectomy      Hx tmj arthotomy      Egd  2014          Hx tonsillectomy      Hx cataract removal  2015     right eye    Hx orthopaedic       bilateral knees arthroscopy         Family History:   Problem Relation Age of Onset    Heart Disease Mother     Heart Disease Father        Social History     Social History    Marital status:      Spouse name: N/A    Number of children: N/A    Years of education: N/A     Occupational History    Not on file. Social History Main Topics    Smoking status: Former Smoker     Packs/day: 0.50     Years: 35.00     Types: Cigarettes     Quit date: 2014    Smokeless tobacco: Never Used    Alcohol use No    Drug use: No    Sexual activity: No     Other Topics Concern    Not on file     Social History Narrative         ALLERGIES: Percocet [oxycodone-acetaminophen]    Review of Systems   Constitutional: Negative for fever. HENT: Positive for rhinorrhea. Respiratory: Negative for cough. Cardiovascular: Positive for chest pain (pressure). Gastrointestinal: Positive for abdominal pain, constipation, nausea and vomiting. All other systems reviewed and are negative. Vitals:    17 1649   BP: 141/69   Pulse: 99   Resp: 18   Temp: 97.9 °F (36.6 °C)   SpO2: 98%   Weight: 63 kg (139 lb)   Height: 5' 6.25\" (1.683 m)            Physical Exam   Constitutional: She is oriented to person, place, and time. She appears well-developed and well-nourished. No distress. HENT:   Head: Normocephalic and atraumatic. Eyes: Conjunctivae are normal.   Neck: Normal range of motion. Cardiovascular: Normal rate, regular rhythm, normal heart sounds and intact distal pulses. Exam reveals no friction rub. No murmur heard. Pulmonary/Chest: Effort normal and breath sounds normal. No respiratory distress. She has no wheezes. She has no rales. Abdominal: Soft. Bowel sounds are normal. She exhibits no distension. There is tenderness. There is no rebound and no guarding. Mild epigastric ttp   Musculoskeletal: Normal range of motion. Neurological: She is alert and oriented to person, place, and time. Coordination normal.   Skin: Skin is warm and dry. She is not diaphoretic. No pallor. Psychiatric: She has a normal mood and affect. Her behavior is normal.   Nursing note and vitals reviewed. MDM  Number of Diagnoses or Management Options  Diagnosis management comments: Gastroparesis vs obstipation causing vomiting vs pancreatitis       Amount and/or Complexity of Data Reviewed  Clinical lab tests: reviewed and ordered  Tests in the radiology section of CPT®: ordered and reviewed  Independent visualization of images, tracings, or specimens: yes (ekg)    Patient Progress  Patient progress: stable    ED Course       Procedures    EKG interpretation: (Preliminary)  Rhythm: sinus tachycardia; and regular . Rate (approx.): 100; Axis: normal; P wave: normal; QRS interval: normal ; ST/T wave: non-specific changes    12:13 AM  No vomiting here pt denies drinking ethylene glycol, methanol taking aspirin. Suspect ag acidosis is from dehydration. Blood sugar not elevated. Will recheck after 2 liters of fluid.  Care turned over to dr Dre Bee

## 2017-02-17 NOTE — ED NOTES
Patient appeared to have a reaction possibly to Benadryl. Compazine was being inserted into the IV tubing when patient complained of pain at the IV site. The Compazine was not administered through the line at that time. However the line was flushed, MD will order more compazine to help with nausea and watch for reaction.  IV was flushed with 10cc and redness around the site went away

## 2017-02-17 NOTE — DISCHARGE INSTRUCTIONS
Abdominal Pain: Care Instructions  Your Care Instructions    Abdominal pain has many possible causes. Some aren't serious and get better on their own in a few days. Others need more testing and treatment. If your pain continues or gets worse, you need to be rechecked and may need more tests to find out what is wrong. You may need surgery to correct the problem. Don't ignore new symptoms, such as fever, nausea and vomiting, urination problems, pain that gets worse, and dizziness. These may be signs of a more serious problem. Your doctor may have recommended a follow-up visit in the next 8 to 12 hours. If you are not getting better, you may need more tests or treatment. The doctor has checked you carefully, but problems can develop later. If you notice any problems or new symptoms, get medical treatment right away. Follow-up care is a key part of your treatment and safety. Be sure to make and go to all appointments, and call your doctor if you are having problems. It's also a good idea to know your test results and keep a list of the medicines you take. How can you care for yourself at home? · Rest until you feel better. · To prevent dehydration, drink plenty of fluids, enough so that your urine is light yellow or clear like water. Choose water and other caffeine-free clear liquids until you feel better. If you have kidney, heart, or liver disease and have to limit fluids, talk with your doctor before you increase the amount of fluids you drink. · If your stomach is upset, eat mild foods, such as rice, dry toast or crackers, bananas, and applesauce. Try eating several small meals instead of two or three large ones. · Wait until 48 hours after all symptoms have gone away before you have spicy foods, alcohol, and drinks that contain caffeine. · Do not eat foods that are high in fat. · Avoid anti-inflammatory medicines such as aspirin, ibuprofen (Advil, Motrin), and naproxen (Aleve).  These can cause stomach upset. Talk to your doctor if you take daily aspirin for another health problem. When should you call for help? Call 911 anytime you think you may need emergency care. For example, call if:  · You passed out (lost consciousness). · You pass maroon or very bloody stools. · You vomit blood or what looks like coffee grounds. · You have new, severe belly pain. Call your doctor now or seek immediate medical care if:  · Your pain gets worse, especially if it becomes focused in one area of your belly. · You have a new or higher fever. · Your stools are black and look like tar, or they have streaks of blood. · You have unexpected vaginal bleeding. · You have symptoms of a urinary tract infection. These may include:  ¨ Pain when you urinate. ¨ Urinating more often than usual.  ¨ Blood in your urine. · You are dizzy or lightheaded, or you feel like you may faint. Watch closely for changes in your health, and be sure to contact your doctor if:  · You are not getting better after 1 day (24 hours). Where can you learn more? Go to http://luis enriqueSoocialmelinda.info/. Enter Y679 in the search box to learn more about \"Abdominal Pain: Care Instructions. \"  Current as of: May 27, 2016  Content Version: 11.1  © 9429-2272 [employer]. Care instructions adapted under license by [employer] (which disclaims liability or warranty for this information). If you have questions about a medical condition or this instruction, always ask your healthcare professional. [place] [address] any warranty or liability for your use of this information. Nausea and Vomiting: Care Instructions  Your Care Instructions    When you are nauseated, you may feel weak and sweaty and notice a lot of saliva in your mouth. Nausea often leads to vomiting. Most of the time you do not need to worry about nausea and vomiting, but they can be signs of other illnesses.   Two common causes of nausea and vomiting are stomach flu and food poisoning. Nausea and vomiting from viral stomach flu will usually start to improve within 24 hours. Nausea and vomiting from food poisoning may last from 12 to 48 hours. The doctor has checked you carefully, but problems can develop later. If you notice any problems or new symptoms, get medical treatment right away. Follow-up care is a key part of your treatment and safety. Be sure to make and go to all appointments, and call your doctor if you are having problems. It's also a good idea to know your test results and keep a list of the medicines you take. How can you care for yourself at home? · To prevent dehydration, drink plenty of fluids, enough so that your urine is light yellow or clear like water. Choose water and other caffeine-free clear liquids until you feel better. If you have kidney, heart, or liver disease and have to limit fluids, talk with your doctor before you increase the amount of fluids you drink. · Rest in bed until you feel better. · When you are able to eat, try clear soups, mild foods, and liquids until all symptoms are gone for 12 to 48 hours. Other good choices include dry toast, crackers, cooked cereal, and gelatin dessert, such as Jell-O. When should you call for help? Call 911 anytime you think you may need emergency care. For example, call if:  · You passed out (lost consciousness). Call your doctor now or seek immediate medical care if:  · You have symptoms of dehydration, such as:  ¨ Dry eyes and a dry mouth. ¨ Passing only a little dark urine. ¨ Feeling thirstier than usual.  · You have new or worsening belly pain. · You have a new or higher fever. · You vomit blood or what looks like coffee grounds. Watch closely for changes in your health, and be sure to contact your doctor if:  · You have ongoing nausea and vomiting. · Your vomiting is getting worse. · Your vomiting lasts longer than 2 days.   · You are not getting better as expected. Where can you learn more? Go to http://luis enrique-melinda.info/. Enter 25 577268 in the search box to learn more about \"Nausea and Vomiting: Care Instructions. \"  Current as of: May 27, 2016  Content Version: 11.1  © 1694-4616 [employer]. Care instructions adapted under license by [employer] (which disclaims liability or warranty for this information). If you have questions about a medical condition or this instruction, always ask your healthcare professional. [place] [address] any warranty or liability for your use of this information. Gastroparesis: Care Instructions  Your Care Instructions    When you have gastroparesis, your stomach takes a lot longer to empty. This delay can cause belly pain, bloating, and belching. It also can cause hiccups, heartburn, nausea or vomiting. You may not feel like eating. These symptoms may come and go. They most often occur during and after meals. You may feel full after only a few bites of food. This condition occurs when the nerves to the stomach don't work properly. Diabetes is the most common cause of this nerve damage. Gastroparesis can make it harder to control your blood sugar levels. But keeping your blood sugar levels under control may help with your symptoms. Parkinson's disease, stroke, and some medicines can also cause this condition. Home treatment can often help. Follow-up care is a key part of your treatment and safety. Be sure to make and go to all appointments, and call your doctor if you are having problems. It's also a good idea to know your test results and keep a list of the medicines you take. How can you care for yourself at home? · Eat several small meals each day rather than three large meals. · Eat foods that are low in fiber and fat. · If your doctor suggests it, take medicines that help the stomach empty more quickly. These are called motility agents.   When should you call for help? Call your doctor now or seek immediate medical care if:  · You have new or worse belly pain. · Your belly pain lasts longer than 24 hours and is not getting better. Watch closely for changes in your health, and be sure to contact your doctor if:  · You have digestive problems that get worse or occur more often. · You are losing weight. Where can you learn more? Go to http://luis enrique-melinda.info/. Enter M106 in the search box to learn more about \"Gastroparesis: Care Instructions. \"  Current as of: August 9, 2016  Content Version: 11.1  © 6013-8884 [employer]. Care instructions adapted under license by [employer] (which disclaims liability or warranty for this information). If you have questions about a medical condition or this instruction, always ask your healthcare professional. [address] any warranty or liability for your use of this information. We hope that we have addressed all of your medical concerns. The examination and treatment you received in the Emergency Department were for an emergent problem and were not intended as complete care. It is important that you follow up with your healthcare provider(s) for ongoing care. If your symptoms worsen or do not improve as expected, and you are unable to reach your usual health care provider(s), you should return to the Emergency Department. Today's healthcare is undergoing tremendous change, and patient satisfaction surveys are one of the many tools to assess the quality of medical care. You may receive a survey from the [employer] organization regarding your experience in the Emergency Department. I hope that your experience has been completely positive, particularly the medical care that I provided. As such, please participate in the survey; anything less than excellent does not meet my expectations or intentions.         [facility] and [address] participate in nationally recognized quality of care measures. If your blood pressure is greater than 120/80, as reported below, we urge that you seek medical care to address the potential of high blood pressure, commonly known as hypertension. Hypertension can be hereditary or can be caused by certain medical conditions, pain, stress, or \"white coat syndrome. \"       Please make an appointment with your health care provider(s) for follow up of your Emergency Department visit. VITALS:   Patient Vitals for the past 8 hrs:   Pulse Resp BP SpO2   02/17/17 0230 90 18 116/41 96 %   02/17/17 0130 92 21 140/57 97 %   02/17/17 0030 95 18 138/57 96 %   02/16/17 2330 100 19 140/63 98 %   02/16/17 2230 (!) 103 19 135/57 97 %          Thank you for allowing us to provide you with medical care today. We realize that you have many choices for your emergency care needs. Please choose us in the future for any continued health care needs. Vincenzo Davis [facility], [address] [place] [address] 20.   Office: [phone]            Recent Results (from the past 24 hour(s))   URINALYSIS W/ REFLEX CULTURE    Collection Time: 02/16/17  5:06 PM   Result Value Ref Range    Color YELLOW/STRAW      Appearance CLEAR CLEAR      Specific gravity 1.019 1.003 - 1.030      pH (UA) 5.0 5.0 - 8.0      Protein 100 (A) NEG mg/dL    Glucose NEGATIVE  NEG mg/dL    Ketone 15 (A) NEG mg/dL    Blood SMALL (A) NEG      Urobilinogen 0.2 0.2 - 1.0 EU/dL    Nitrites NEGATIVE  NEG      Leukocyte Esterase SMALL (A) NEG      WBC 5-10 0 - 4 /hpf    RBC 5-10 0 - 5 /hpf    Epithelial cells FEW FEW /lpf    Bacteria NEGATIVE  NEG /hpf    UA:UC IF INDICATED CULTURE NOT INDICATED BY UA RESULT CNI      Hyaline cast 0-2 0 - 5 /lpf   BILIRUBIN, CONFIRM    Collection Time: 02/16/17  5:06 PM   Result Value Ref Range    Bilirubin UA, confirm NEGATIVE  NEG     GLUCOSE, POC    Collection Time: 02/16/17 10:00 PM Result Value Ref Range    Glucose (POC) 161 (H) 65 - 100 mg/dL    Performed by [employer]    CBC WITH AUTOMATED DIFF    Collection Time: 02/16/17 10:14 PM   Result Value Ref Range    WBC 11.8 (H) 3.6 - 11.0 K/uL    RBC 4.65 3.80 - 5.20 M/uL    HGB 13.9 11.5 - 16.0 g/dL    HCT 41.3 35.0 - 47.0 %    MCV 88.8 80.0 - 99.0 FL    MCH 29.9 26.0 - 34.0 PG    MCHC 33.7 30.0 - 36.5 g/dL    RDW 15.1 (H) 11.5 - 14.5 %    PLATELET 737 331 - 473 K/uL    NEUTROPHILS 84 (H) 32 - 75 %    LYMPHOCYTES 10 (L) 12 - 49 %    MONOCYTES 6 5 - 13 %    EOSINOPHILS 0 0 - 7 %    BASOPHILS 0 0 - 1 %    ABS. NEUTROPHILS 9.9 (H) 1.8 - 8.0 K/UL    ABS. LYMPHOCYTES 1.2 0.8 - 3.5 K/UL    ABS. MONOCYTES 0.7 0.0 - 1.0 K/UL    ABS. EOSINOPHILS 0.0 0.0 - 0.4 K/UL    ABS. BASOPHILS 0.0 0.0 - 0.1 K/UL   METABOLIC PANEL, COMPREHENSIVE    Collection Time: 02/16/17 10:14 PM   Result Value Ref Range    Sodium 138 136 - 145 mmol/L    Potassium 4.5 3.5 - 5.1 mmol/L    Chloride 101 97 - 108 mmol/L    CO2 18 (L) 21 - 32 mmol/L    Anion gap 19 (H) 5 - 15 mmol/L    Glucose 169 (H) 65 - 100 mg/dL    BUN 58 (H) 6 - 20 MG/DL    Creatinine 1.68 (H) 0.55 - 1.02 MG/DL    BUN/Creatinine ratio 35 (H) 12 - 20      GFR est AA 38 (L) >60 ml/min/1.73m2    GFR est non-AA 31 (L) >60 ml/min/1.73m2    Calcium 9.3 8.5 - 10.1 MG/DL    Bilirubin, total 0.5 0.2 - 1.0 MG/DL    ALT (SGPT) 27 12 - 78 U/L    AST (SGOT) 21 15 - 37 U/L    Alk.  phosphatase 93 45 - 117 U/L    Protein, total 8.3 (H) 6.4 - 8.2 g/dL    Albumin 4.4 3.5 - 5.0 g/dL    Globulin 3.9 2.0 - 4.0 g/dL    A-G Ratio 1.1 1.1 - 2.2     LIPASE    Collection Time: 02/16/17 10:14 PM   Result Value Ref Range    Lipase 164 73 - 393 U/L   TROPONIN I    Collection Time: 02/16/17 11:44 PM   Result Value Ref Range    Troponin-I, Qt. <0.04 <1.74 ng/mL   METABOLIC PANEL, BASIC    Collection Time: 02/17/17  2:10 AM   Result Value Ref Range    Sodium 141 136 - 145 mmol/L    Potassium 4.0 3.5 - 5.1 mmol/L    Chloride 108 97 - 108 mmol/L    CO2 23 21 - 32 mmol/L    Anion gap 10 5 - 15 mmol/L    Glucose 120 (H) 65 - 100 mg/dL    BUN 51 (H) 6 - 20 MG/DL    Creatinine 1.44 (H) 0.55 - 1.02 MG/DL    BUN/Creatinine ratio 35 (H) 12 - 20      GFR est AA 45 (L) >60 ml/min/1.73m2    GFR est non-AA 38 (L) >60 ml/min/1.73m2    Calcium 7.8 (L) 8.5 - 10.1 MG/DL       Xr Abd Acute W 1 V Chest    Result Date: 2/16/2017  EXAM:  XR ABD ACUTE W 1 V CHEST INDICATION:  Abdominal Pain COMPARISON: 2016. FINDINGS: The upright chest radiograph demonstrates clear lungs except for minor areas of atelectasis/scarring. There is no pleural effusion or free air under the diaphragm. Supine and upright views of the abdomen demonstrate a nonobstructive bowel gas pattern. There is no free intraperitoneal air. Atherosclerotic changes are noted with stent in the right iliac artery. Pacer leads overlie upper abdomen. . Bony structures are intact. IMPRESSION: No free air. Gas pattern is within normal limits.

## 2017-02-21 ENCOUNTER — OFFICE VISIT (OUTPATIENT)
Dept: INTERNAL MEDICINE CLINIC | Age: 57
End: 2017-02-21

## 2017-02-21 VITALS
RESPIRATION RATE: 16 BRPM | SYSTOLIC BLOOD PRESSURE: 144 MMHG | DIASTOLIC BLOOD PRESSURE: 72 MMHG | HEIGHT: 66 IN | BODY MASS INDEX: 22.98 KG/M2 | WEIGHT: 143 LBS | TEMPERATURE: 97.5 F | HEART RATE: 80 BPM

## 2017-02-21 DIAGNOSIS — I73.9 PAD (PERIPHERAL ARTERY DISEASE) (HCC): Chronic | ICD-10-CM

## 2017-02-21 DIAGNOSIS — E10.22 TYPE 1 DIABETES MELLITUS WITH STAGE 3 CHRONIC KIDNEY DISEASE (HCC): Chronic | ICD-10-CM

## 2017-02-21 DIAGNOSIS — K31.84 GASTROPARESIS: Primary | ICD-10-CM

## 2017-02-21 DIAGNOSIS — J01.00 ACUTE NON-RECURRENT MAXILLARY SINUSITIS: ICD-10-CM

## 2017-02-21 DIAGNOSIS — I25.5 ISCHEMIC CARDIOMYOPATHY: ICD-10-CM

## 2017-02-21 DIAGNOSIS — E78.5 DYSLIPIDEMIA: ICD-10-CM

## 2017-02-21 DIAGNOSIS — N18.30 TYPE 1 DIABETES MELLITUS WITH STAGE 3 CHRONIC KIDNEY DISEASE (HCC): Chronic | ICD-10-CM

## 2017-02-21 RX ORDER — METOCLOPRAMIDE 10 MG/1
10 TABLET ORAL
Qty: 120 TAB | Refills: 3 | Status: SHIPPED | OUTPATIENT
Start: 2017-02-21 | End: 2017-03-03

## 2017-02-21 RX ORDER — AZITHROMYCIN 250 MG/1
250 TABLET, FILM COATED ORAL SEE ADMIN INSTRUCTIONS
Qty: 6 TAB | Refills: 0 | Status: SHIPPED | OUTPATIENT
Start: 2017-02-21 | End: 2017-02-26

## 2017-02-21 NOTE — PROGRESS NOTES
HISTORY OF PRESENT ILLNESS  Kiah Brocket is a 61 y.o. female. HPI     Pt presented to the ED on 2/17/17 with complaints of vomiting. She reported vomiting and then experiencing chest pressure and abdominal pain. She also stated that she was constipated. She was prescribed Reglan at this time and states she needs a refill. Abdominal x-ray showed no free air and gas pattern was within normal limits. She has history of ulcers and gastroparesis. Today pt states that she is still vomiting a few times/day and she thinks it is related to gastroparesis. Pt thinks that Reglan may be helping although notes she has issues with swallowing pills because they feel like they get stuck in sternal chest region. She also has trouble eating because food feels like it is getting stuck and she needs to throw up. Reports she finally had a BM although it was at first very watery diarrhea. Pt has an appointment with Dr. Dayan Galaviz NP tomorrow. She notes that Dr. Nelson Lennon put a pacemaker in 10 years ago to help stimulate her stomach due to gastroparesis. Diabetic Review of Systems - medication compliance: compliant all of the time, home glucose monitoring: is performed regularly, further diabetic ROS: no polyuria or polydipsia, no chest pain, dyspnea or TIA's, no numbness, tingling or pain in extremities. Other symptoms and concerns: Reports her highest sugar was in the 160's. Fasting sugar was 94 yesterday and 117 this morning. Pt called Dr. Chapincito Leone and is trying to get worked in sooner. Pt denies shortness of breath or chest pain and states she has some chest tightness. Pt is taking Ranexa and her Troponin levels were fine in the ED. Reports she has sinus congestion and yellow mucus. Hyperlipidemia:  Cardiovascular risk analysis - 61 y.o. female LDL goal is under 100.    ROS: taking medications as instructed, no medication side effects noted, no TIA's, no chest pain on exertion, no dyspnea on exertion, no swelling of ankles. Tolerating meds, no myalgias or other side effects noted  New concerns: Her lipids were elevated in 01/2017 and Dr. Kisha Alston was suspicious for laboratory error. Lipids were repeated on 2/15/17 and decreased. She denies a change in her eating habits and states that she has consistently taken Crestor. Review of Systems   Gastrointestinal: Positive for vomiting. All other systems reviewed and are negative. Physical Exam   Constitutional: She is oriented to person, place, and time. She appears well-developed and well-nourished. HENT:   Head: Normocephalic and atraumatic. Right Ear: External ear normal.   Left Ear: External ear normal.   Nose: Nose normal.   Mouth/Throat: Oropharynx is clear and moist.   Eyes: Conjunctivae and EOM are normal.   Neck: Normal range of motion. Neck supple. Carotid bruit is not present. No thyroid mass and no thyromegaly present. Cardiovascular: Normal rate, regular rhythm, S1 normal, S2 normal, normal heart sounds and intact distal pulses. Pulmonary/Chest: Effort normal and breath sounds normal.   Abdominal: Soft. Normal appearance and bowel sounds are normal. There is no hepatosplenomegaly. There is no tenderness. Musculoskeletal: Normal range of motion. Neurological: She is alert and oriented to person, place, and time. She has normal strength. No cranial nerve deficit or sensory deficit. Coordination normal.   Skin: Skin is warm, dry and intact. No abrasion and no rash noted. Psychiatric: She has a normal mood and affect. Her behavior is normal. Judgment and thought content normal.   Nursing note and vitals reviewed. ASSESSMENT and PLAN  Shanta Barone was seen today for hospital follow up. Diagnoses and all orders for this visit:    Gastroparesis  Advised pt that she may have had a GI bug that triggered gastroparesis flare. Pt is still vomiting a few times/day and has difficulty swallowing medications and food.  She will see GI tomorrow and may need to be scoped given her history of ulcers. Pt to continue taking Reglan. She had pacemaker for gastroparesis placed 10 years ago and may need to follow up with Dr. Daryle Better regarding this although first needs to see GI.  -     metoclopramide HCl (REGLAN) 10 mg tablet; Take 1 Tab by mouth every six (6) hours as needed for Nausea for up to 10 days. Type 1 diabetes mellitus with stage 3 chronic kidney disease (HCC)  Stable- Continue current medications. PAD (peripheral artery disease) (HCC)  Stable- Continue current medications. Ischemic cardiomyopathy  Stable- Continue current medications. Pt is followed by Dr. Krystal iSu. Acute non-recurrent maxillary sinusitis  Pt to begin taking Zpak. -     azithromycin (ZITHROMAX) 250 mg tablet; Take 1 Tab by mouth See Admin Instructions for 5 days. Dyslipidemia  Stable- Continue current medications. lab results and schedule of future lab studies reviewed with patient  reviewed medications and side effects in detail     Written by Mile Goncalves, as dictated by Aleja Avila MD.     Current diagnosis and concerns discussed with pt at length. Understands risks and benefits or current treatment plan and medications and accepts the treatment and medication with any possible risks. Pt asks appropriate questions which were answered. Pt instructed to call with any concerns or problems.

## 2017-03-03 DIAGNOSIS — I25.5 ISCHEMIC CARDIOMYOPATHY: ICD-10-CM

## 2017-03-03 RX ORDER — LANCETS
EACH MISCELLANEOUS
Qty: 1 EACH | Refills: 11 | Status: SHIPPED | OUTPATIENT
Start: 2017-03-03 | End: 2018-07-22

## 2017-03-03 RX ORDER — LISINOPRIL 5 MG/1
5 TABLET ORAL DAILY
Qty: 90 TAB | Refills: 0 | Status: SHIPPED | OUTPATIENT
Start: 2017-03-03 | End: 2018-02-07 | Stop reason: SDUPTHER

## 2017-03-03 RX ORDER — ROSUVASTATIN CALCIUM 10 MG/1
10 TABLET, COATED ORAL
Qty: 90 TAB | Refills: 0 | Status: SHIPPED | OUTPATIENT
Start: 2017-03-03 | End: 2017-09-13 | Stop reason: SDUPTHER

## 2017-03-23 ENCOUNTER — HOSPITAL ENCOUNTER (OUTPATIENT)
Dept: GENERAL RADIOLOGY | Age: 57
Discharge: HOME | End: 2017-03-23
Attending: NURSE PRACTITIONER
Payer: COMMERCIAL

## 2017-03-23 ENCOUNTER — OFFICE VISIT (OUTPATIENT)
Dept: INTERNAL MEDICINE CLINIC | Age: 57
End: 2017-03-23

## 2017-03-23 VITALS
TEMPERATURE: 98.5 F | RESPIRATION RATE: 18 BRPM | BODY MASS INDEX: 23.32 KG/M2 | WEIGHT: 145.13 LBS | SYSTOLIC BLOOD PRESSURE: 167 MMHG | OXYGEN SATURATION: 98 % | DIASTOLIC BLOOD PRESSURE: 83 MMHG | HEART RATE: 95 BPM | HEIGHT: 66 IN

## 2017-03-23 DIAGNOSIS — N18.30 TYPE 1 DIABETES MELLITUS WITH STAGE 3 CHRONIC KIDNEY DISEASE (HCC): Chronic | ICD-10-CM

## 2017-03-23 DIAGNOSIS — J40 BRONCHITIS: ICD-10-CM

## 2017-03-23 DIAGNOSIS — J44.1 CHRONIC OBSTRUCTIVE PULMONARY DISEASE WITH ACUTE EXACERBATION (HCC): Chronic | ICD-10-CM

## 2017-03-23 DIAGNOSIS — J40 BRONCHITIS: Primary | ICD-10-CM

## 2017-03-23 DIAGNOSIS — S99.922A FOOT INJURY, LEFT, INITIAL ENCOUNTER: ICD-10-CM

## 2017-03-23 DIAGNOSIS — E10.22 TYPE 1 DIABETES MELLITUS WITH STAGE 3 CHRONIC KIDNEY DISEASE (HCC): Chronic | ICD-10-CM

## 2017-03-23 PROCEDURE — 71020 XR CHEST PA LAT: CPT

## 2017-03-23 PROCEDURE — 73630 X-RAY EXAM OF FOOT: CPT

## 2017-03-23 RX ORDER — OMEPRAZOLE AND SODIUM BICARBONATE 40; 1100 MG/1; MG/1
CAPSULE ORAL
Refills: 3 | COMMUNITY
Start: 2017-03-13 | End: 2018-07-22 | Stop reason: ALTCHOICE

## 2017-03-23 RX ORDER — ALBUTEROL SULFATE 0.83 MG/ML
2.5 SOLUTION RESPIRATORY (INHALATION) ONCE
Qty: 1 EACH | Refills: 0
Start: 2017-03-23 | End: 2017-03-23

## 2017-03-23 RX ORDER — LINACLOTIDE 290 UG/1
CAPSULE, GELATIN COATED ORAL
Refills: 1 | COMMUNITY
Start: 2017-02-22 | End: 2017-08-21

## 2017-03-23 RX ORDER — PREDNISONE 20 MG/1
20 TABLET ORAL 2 TIMES DAILY
Qty: 10 TAB | Refills: 0 | Status: SHIPPED | OUTPATIENT
Start: 2017-03-23 | End: 2017-08-21

## 2017-03-23 RX ORDER — ALBUTEROL SULFATE 0.83 MG/ML
2.5 SOLUTION RESPIRATORY (INHALATION)
Qty: 24 EACH | Refills: 2 | Status: SHIPPED | OUTPATIENT
Start: 2017-03-23 | End: 2017-10-24

## 2017-03-23 RX ORDER — AZITHROMYCIN 250 MG/1
250 TABLET, FILM COATED ORAL SEE ADMIN INSTRUCTIONS
Qty: 6 TAB | Refills: 0 | Status: SHIPPED | OUTPATIENT
Start: 2017-03-23 | End: 2017-03-27 | Stop reason: ALTCHOICE

## 2017-03-23 RX ORDER — ALBUTEROL SULFATE 90 UG/1
2 AEROSOL, METERED RESPIRATORY (INHALATION)
Qty: 1 INHALER | Refills: 0 | Status: SHIPPED | OUTPATIENT
Start: 2017-03-23 | End: 2017-10-24

## 2017-03-23 RX ORDER — HYDROCODONE POLISTIREX AND CHLORPHENIRAMINE POLISTIREX 10; 8 MG/5ML; MG/5ML
1 SUSPENSION, EXTENDED RELEASE ORAL
Qty: 115 ML | Refills: 0 | Status: SHIPPED | OUTPATIENT
Start: 2017-03-23 | End: 2018-07-22 | Stop reason: ALTCHOICE

## 2017-03-23 RX ORDER — BUPROPION HYDROCHLORIDE 100 MG/1
TABLET ORAL
Qty: 60 TAB | Refills: 4 | Status: SHIPPED | OUTPATIENT
Start: 2017-03-23 | End: 2017-08-16 | Stop reason: SDUPTHER

## 2017-03-23 RX ORDER — LEVOFLOXACIN 500 MG/1
500 TABLET, FILM COATED ORAL DAILY
Qty: 10 TAB | Refills: 0 | Status: SHIPPED | OUTPATIENT
Start: 2017-03-23 | End: 2017-03-23 | Stop reason: ALTCHOICE

## 2017-03-23 NOTE — MR AVS SNAPSHOT
Visit Information Date & Time Provider Department Dept. Phone Encounter #  
 3/23/2017  3:40 PM Lizet Markham NP [facility] [address]  [id number] Your Appointments 3/28/2017  3:00 PM  
ECHO CARDIOGRAMS 2D with ALINE SOUZA  
[facility] ([facility]) Appt Note: echo at 3pm 6mo fu dr Laure Hsu [address] [facility]  
[phone]  
  
   
 [address] [facility] [address]  
  
    
 8/21/2017  9:40 AM  
ESTABLISHED PATIENT with Reina Santiago MD  
[facility] ([facility]) Appt Note: echo at 3pm 6mo fu dr Laure Hsu; [address] [facility]  
[address] [address] [address] Upcoming Health Maintenance Date Due Pneumococcal 19-64 Highest Risk (1 of 3 - PCV13) 7/20/1978 FOBT Q 1 YEAR AGE 50-75 9/25/2015 EYE EXAM RETINAL OR DILATED Q1 10/6/2016 BREAST CANCER SCRN MAMMOGRAM 5/21/2017 HEMOGLOBIN A1C Q6M 7/25/2017 FOOT EXAM Q1 1/25/2018 MICROALBUMIN Q1 1/25/2018 LIPID PANEL Q1 2/15/2018 PAP AKA CERVICAL CYTOLOGY 8/6/2018 DTaP/Tdap/Td series (2 - Td) 7/30/2023 Allergies as of 3/23/2017  Review Complete On: 3/23/2017 By: Lizet Markham NP Severity Noted Reaction Type Reactions Compazine [Prochlorperazine Edisylate]  02/21/2017    Rash Percocet [Oxycodone-acetaminophen]  06/28/2015    Nausea Only ++ Hydrocodone (Norco/vicodin), Morphine, Hydromorphone (Dialudid) are okay per patient ++ Current Immunizations  Reviewed on 10/22/2014 Name Date Influenza Vaccine 11/10/2016, 10/1/2015 Tdap 7/30/2013  8:54 PM  
  
 Not reviewed this visit You Were Diagnosed With   
  
 Codes Comments Bronchitis    -  Primary ICD-10-CM: J99 ICD-9-CM: 929 Chronic obstructive pulmonary disease with acute exacerbation (HCC)     ICD-10-CM: J44.1 ICD-9-CM: 491.21 Foot injury, left, initial encounter     ICD-10-CM: O24.310T ICD-9-CM: 317. 7 Vitals BP Pulse Temp Resp Height(growth percentile) Weight(growth percentile) 167/83 (BP 1 Location: Left arm, BP Patient Position: Sitting) 95 98.5 °F (36.9 °C) (Oral) 18 5' 6\" (1.676 m) 145 lb 2 oz (65.8 kg) SpO2 BMI OB Status Smoking Status 98% 23.42 kg/m2 Postmenopausal Former Smoker Vitals History BMI and BSA Data Body Mass Index Body Surface Area  
 23.42 kg/m 2 1.75 m 2 Preferred Pharmacy Pharmacy Name Phone [id number] [phone] , [address] [place] [address]  [phone] Your Updated Medication List  
  
   
This list is accurate as of: 3/23/17  4:51 PM.  Always use your most recent med list.  
  
  
  
  
  insulin pump Misc Commonly known as:  PATIENT SUPPLIED  
as needed. Humalog insulin pump basal + bolus * albuterol 2.5 mg /3 mL (0.083 %) nebulizer solution Commonly known as:  PROVENTIL VENTOLIN  
3 mL by Nebulization route once for 1 dose. * albuterol 90 mcg/actuation inhaler Commonly known as:  PROVENTIL HFA, VENTOLIN HFA, PROAIR HFA Take 2 Puffs by inhalation every six (6) hours as needed for Wheezing. * albuterol 2.5 mg /3 mL (0.083 %) nebulizer solution Commonly known as:  PROVENTIL VENTOLIN  
3 mL by Nebulization route every four (4) hours as needed for Wheezing. aspirin 81 mg chewable tablet Take 81 mg by mouth daily. buPROPion 100 mg tablet Commonly known as:  WELLBUTRIN  
TAKE ONE TABLET BY MOUTH TWO TIMES A DAY  
  
 carvedilol 6.25 mg tablet Commonly known as:  COREG  
TAKE 1 TABLET BY MOUTH EVERY MORNING & TAKE 2 TABLETS BY MOUTH EVERY EVENING  
  
 chlorpheniramine-HYDROcodone 10-8 mg/5 mL suspension Commonly known as:  Martha Henderson Take 5 mL by mouth every twelve (12) hours as needed for Cough. Max Daily Amount: 10 mL. glucose blood VI test strips strip Commonly known as:  Ascensia CONTOUR To test daily blood sugars. insulin lispro 100 unit/mL injection Commonly known as:  HumaLOG  
1 Units by SubCUTAneous route continuous. To use for insulin pump. * Lancets Misc To check daily blood sugars. * Lancets Misc Onetouch Ultrasoft lancets  
  
 levoFLOXacin 500 mg tablet Commonly known as:  [place] Armor Take 1 Tab by mouth daily. levothyroxine 175 mcg tablet Commonly known as:  SYNTHROID Take 1 Tab by mouth daily. LINZESS 290 mcg Cap capsule Generic drug:  linaclotide  
  
 lisinopril 5 mg tablet Commonly known as:  Nonah Skye Take 1 Tab by mouth daily for 90 days. MUCINEX 1,200 mg Ta12 ER tablet Generic drug:  guaiFENesin Take 1,200 mg by mouth two (2) times a day. nitroglycerin 0.4 mg SL tablet Commonly known as:  NITROSTAT  
1 Tab by SubLINGual route every five (5) minutes as needed for Chest Pain. Indications: ANGINA  
  
 omeprazole-sodium bicarbonate 40-1.1 mg-gram capsule Commonly known as:  ZEGERID  
  
 predniSONE 20 mg tablet Commonly known as:  Michael Gun Take 1 Tab by mouth two (2) times a day. RANEXA 500 mg SR tablet Generic drug:  ranolazine ER  
TAKE 1 TABLET BY MOUTH TWO TIMES A DAY  
  
 rosuvastatin 10 mg tablet Commonly known as:  CRESTOR Take 1 Tab by mouth nightly for 90 days. VITAMIN D3 2,000 unit Tab Generic drug:  cholecalciferol (vitamin D3) Take  by mouth. * Notice: This list has 5 medication(s) that are the same as other medications prescribed for you. Read the directions carefully, and ask your doctor or other care provider to review them with you. Prescriptions Printed  Refills  
 chlorpheniramine-HYDROcodone (TUSSIONEX) 10-8 mg/5 mL suspension 0  
 Sig: Take 5 mL by mouth every twelve (12) hours as needed for Cough. Max Daily Amount: 10 mL. Class: Print Route: Oral  
  
Prescriptions Sent to Pharmacy Refills  
 predniSONE (DELTASONE) 20 mg tablet 0 Sig: Take 1 Tab by mouth two (2) times a day. Class: Normal  
 Pharmacy: [facility] #[id number]- [id number] [facility], [address] [place] [address] Dr Ph #: [phone] Route: Oral  
 albuterol (PROVENTIL HFA, VENTOLIN HFA, PROAIR HFA) 90 mcg/actuation inhaler 0 Sig: Take 2 Puffs by inhalation every six (6) hours as needed for Wheezing. Class: Normal  
 Pharmacy: [facility]/pharmacy #[id number] [facility], [address] [place] [address] Dr Ph #: [phone] Route: Inhalation  
 levoFLOXacin (LEVAQUIN) 500 mg tablet 0 Sig: Take 1 Tab by mouth daily. Class: Normal  
 [id number] Ph #: [phone] Route: Oral  
 albuterol (PROVENTIL VENTOLIN) 2.5 mg /3 mL (0.083 %) nebulizer solution 2 Sig: 3 mL by Nebulization route every four (4) hours as needed for Wheezing. Class: Normal  
 [id number] [address] Ph #: [phone] Route: Nebulization We Performed the Following ALBUTEROL, INHAL. SOL., FDA-APPROVED FINAL, NON-COMPOUND UNIT DOSE, 1 MG [ [facility]] INHAL RX, AIRWAY OBST/DX SPUTUM INDUCT [id number] CPT(R)] To-Do List   
 03/23/2017 Imaging:  XR CHEST PA LAT   
  
 03/23/2017 Imaging:  XR FOOT LT MIN 3 V Patient Instructions Bronchitis: Care Instructions Your Care Instructions Bronchitis is inflammation of the bronchial tubes, which carry air to the lungs. The tubes swell and produce mucus, or phlegm. The mucus and inflamed bronchial tubes make you cough. You may have trouble breathing. Most cases of bronchitis are caused by viruses like those that cause colds. Antibiotics usually do not help and they may be harmful. Bronchitis usually develops rapidly and lasts about 2 to 3 weeks in otherwise healthy people. Follow-up care is a key part of your treatment and safety. Be sure to make and go to all appointments, and call your doctor if you are having problems. It's also a good idea to know your test results and keep a list of the medicines you take. How can you care for yourself at home? · Take all medicines exactly as prescribed. Call your doctor if you think you are having a problem with your medicine. · Get some extra rest. 
· Take an over-the-counter pain medicine, such as acetaminophen (Tylenol), ibuprofen (Advil, Motrin), or naproxen (Aleve) to reduce fever and relieve body aches. Read and follow all instructions on the label. · Do not take two or more pain medicines at the same time unless the doctor told you to. Many pain medicines have acetaminophen, which is Tylenol. Too much acetaminophen (Tylenol) can be harmful. · Take an over-the-counter cough medicine that contains dextromethorphan to help quiet a dry, hacking cough so that you can sleep. Avoid cough medicines that have more than one active ingredient. Read and follow all instructions on the label. · Breathe moist air from a humidifier, hot shower, or sink filled with hot water. The heat and moisture will thin mucus so you can cough it out. · Do not smoke. Smoking can make bronchitis worse. If you need help quitting, talk to your doctor about stop-smoking programs and medicines. These can increase your chances of quitting for good. When should you call for help? Call 911 anytime you think you may need emergency care. For example, call if: 
· You have severe trouble breathing. Call your doctor now or seek immediate medical care if: 
· You have new or worse trouble breathing. · You cough up dark brown or bloody mucus (sputum). · You have a new or higher fever. · You have a new rash.  
Watch closely for changes in your health, and be sure to contact your doctor if: 
· You cough more deeply or more often, especially if you notice more mucus or a change in the color of your mucus. · You are not getting better as expected. Where can you learn more? Go to http://luis enrique-melinda.info/. Enter H333 in the search box to learn more about \"Bronchitis: Care Instructions. \" Current as of: May 23, 2016 Content Version: 11.1 © 1668-1540 [employer]. Care instructions adapted under license by [employer] (which disclaims liability or warranty for this information). If you have questions about a medical condition or this instruction, always ask your healthcare professional. [address] any warranty or liability for your use of this information. Chronic Obstructive Pulmonary Disease (COPD) Flare-Ups: Care Instructions Your Care Instructions Chronic obstructive pulmonary disease (COPD) is a lung disease that makes it hard to breathe. It is caused by damage to the lungs over many years, usually from smoking. COPD is often a mix of two diseases: · Chronic bronchitis: The airways that carry air to the lungs (bronchial tubes) get inflamed and make a lot of mucus. This can narrow or block the airways. · Emphysema: In a healthy person, the tiny air sacs in the lungs are like balloons. As you breathe in and out, they get bigger and smaller to move air through your lungs. But with emphysema, these air sacs are damaged and lose their stretch. Less air gets in and out of the lungs. Many people with COPD have attacks called flare-ups or exacerbations. This is when your usual symptoms quickly get worse and stay worse. The doctor has checked you carefully. But problems can develop later. If you notice any problems or new symptoms, get medical treatment right away. Follow-up care is a key part of your treatment and safety.  Be sure to make and go to all appointments, and call your doctor if you are having problems. It's also a good idea to know your test results and keep a list of the medicines you take. How can you care for yourself at home? · Be safe with medicines. Take your medicines exactly as prescribed. Call your doctor if you think you are having a problem with your medicine. You may be taking medicines such as: ¨ Bronchodilators. These help open your airways and make breathing easier. ¨ Corticosteroids. These reduce airway inflammation. They may be given as pills, in a vein, or in an inhaled form. You may go home with pills in addition to an inhaler that you already use. · A spacer may help you get more inhaled medicine to your lungs. Ask your doctor or pharmacist if a spacer is right for you. If it is, ask how to use it properly. · If your doctor prescribed antibiotics, take them as directed. Do not stop taking them just because you feel better. You need to take the full course of antibiotics. · If your doctor prescribed oxygen, use the flow rate your doctor has recommended. Do not change it without talking to your doctor first. 
· Do not smoke. Smoking makes COPD worse. If you need help quitting, talk to your doctor about stop-smoking programs and medicines. These can increase your chances of quitting for good. When should you call for help? Call 911 anytime you think you may need emergency care. For example, call if: 
· You have severe trouble breathing. Call your doctor now or seek immediate medical care if: 
· You have new or worse trouble breathing. · Your coughing or wheezing gets worse. · You cough up dark brown or bloody mucus (sputum). · You have a new or higher fever. Watch closely for changes in your health, and be sure to contact your doctor if: 
· You notice more mucus or a change in the color of your mucus. · You need to use your antibiotic or steroid pills. · You do not get better as expected. Where can you learn more? Go to http://luis enrique-melinda.info/. Enter Y544 in the search box to learn more about \"Chronic Obstructive Pulmonary Disease (COPD) Flare-Ups: Care Instructions. \" Current as of: July 21, 2016 Content Version: 11.1 © 7175-5591 [employer]. Care instructions adapted under license by [employer] (which disclaims liability or warranty for this information). If you have questions about a medical condition or this instruction, always ask your healthcare professional. [address] any warranty or liability for your use of this information. Introducing [facility]! Dear Satira Saint: Thank you for requesting a [employer] account. Our records indicate that you have previously registered for a [employer] account but its currently inactive. Please call our [employer] support line at [phone]. Additional Information If you have questions, please visit the Frequently Asked Questions section of the [employer] website at [email or web address] Remember, [employer] is NOT to be used for urgent needs. For medical emergencies, dial 911. Now available from your iPhone and Android! Please provide this summary of care documentation to your next provider. Your primary care clinician is listed as Ana Zarate. If you have any questions after today's visit, please call [phone].

## 2017-03-23 NOTE — PATIENT INSTRUCTIONS
Bronchitis: Care Instructions  Your Care Instructions    Bronchitis is inflammation of the bronchial tubes, which carry air to the lungs. The tubes swell and produce mucus, or phlegm. The mucus and inflamed bronchial tubes make you cough. You may have trouble breathing. Most cases of bronchitis are caused by viruses like those that cause colds. Antibiotics usually do not help and they may be harmful. Bronchitis usually develops rapidly and lasts about 2 to 3 weeks in otherwise healthy people. Follow-up care is a key part of your treatment and safety. Be sure to make and go to all appointments, and call your doctor if you are having problems. It's also a good idea to know your test results and keep a list of the medicines you take. How can you care for yourself at home? · Take all medicines exactly as prescribed. Call your doctor if you think you are having a problem with your medicine. · Get some extra rest.  · Take an over-the-counter pain medicine, such as acetaminophen (Tylenol), ibuprofen (Advil, Motrin), or naproxen (Aleve) to reduce fever and relieve body aches. Read and follow all instructions on the label. · Do not take two or more pain medicines at the same time unless the doctor told you to. Many pain medicines have acetaminophen, which is Tylenol. Too much acetaminophen (Tylenol) can be harmful. · Take an over-the-counter cough medicine that contains dextromethorphan to help quiet a dry, hacking cough so that you can sleep. Avoid cough medicines that have more than one active ingredient. Read and follow all instructions on the label. · Breathe moist air from a humidifier, hot shower, or sink filled with hot water. The heat and moisture will thin mucus so you can cough it out. · Do not smoke. Smoking can make bronchitis worse. If you need help quitting, talk to your doctor about stop-smoking programs and medicines. These can increase your chances of quitting for good.   When should you call for help? Call 911 anytime you think you may need emergency care. For example, call if:  · You have severe trouble breathing. Call your doctor now or seek immediate medical care if:  · You have new or worse trouble breathing. · You cough up dark brown or bloody mucus (sputum). · You have a new or higher fever. · You have a new rash. Watch closely for changes in your health, and be sure to contact your doctor if:  · You cough more deeply or more often, especially if you notice more mucus or a change in the color of your mucus. · You are not getting better as expected. Where can you learn more? Go to http://luis enrique-melinda.info/. Enter H333 in the search box to learn more about \"Bronchitis: Care Instructions. \"  Current as of: May 23, 2016  Content Version: 11.1  © 6107-8904 [employer]. Care instructions adapted under license by [employer] (which disclaims liability or warranty for this information). If you have questions about a medical condition or this instruction, always ask your healthcare professional. [place] [address] any warranty or liability for your use of this information. Chronic Obstructive Pulmonary Disease (COPD) Flare-Ups: Care Instructions  Your Care Instructions    Chronic obstructive pulmonary disease (COPD) is a lung disease that makes it hard to breathe. It is caused by damage to the lungs over many years, usually from smoking. COPD is often a mix of two diseases:  · Chronic bronchitis: The airways that carry air to the lungs (bronchial tubes) get inflamed and make a lot of mucus. This can narrow or block the airways. · Emphysema: In a healthy person, the tiny air sacs in the lungs are like balloons. As you breathe in and out, they get bigger and smaller to move air through your lungs. But with emphysema, these air sacs are damaged and lose their stretch. Less air gets in and out of the lungs.   Many people with COPD have attacks called flare-ups or exacerbations. This is when your usual symptoms quickly get worse and stay worse. The doctor has checked you carefully. But problems can develop later. If you notice any problems or new symptoms, get medical treatment right away. Follow-up care is a key part of your treatment and safety. Be sure to make and go to all appointments, and call your doctor if you are having problems. It's also a good idea to know your test results and keep a list of the medicines you take. How can you care for yourself at home? · Be safe with medicines. Take your medicines exactly as prescribed. Call your doctor if you think you are having a problem with your medicine. You may be taking medicines such as:  ¨ Bronchodilators. These help open your airways and make breathing easier. ¨ Corticosteroids. These reduce airway inflammation. They may be given as pills, in a vein, or in an inhaled form. You may go home with pills in addition to an inhaler that you already use. · A spacer may help you get more inhaled medicine to your lungs. Ask your doctor or pharmacist if a spacer is right for you. If it is, ask how to use it properly. · If your doctor prescribed antibiotics, take them as directed. Do not stop taking them just because you feel better. You need to take the full course of antibiotics. · If your doctor prescribed oxygen, use the flow rate your doctor has recommended. Do not change it without talking to your doctor first.  · Do not smoke. Smoking makes COPD worse. If you need help quitting, talk to your doctor about stop-smoking programs and medicines. These can increase your chances of quitting for good. When should you call for help? Call 911 anytime you think you may need emergency care. For example, call if:  · You have severe trouble breathing. Call your doctor now or seek immediate medical care if:  · You have new or worse trouble breathing. · Your coughing or wheezing gets worse.   · You cough up dark brown or bloody mucus (sputum). · You have a new or higher fever. Watch closely for changes in your health, and be sure to contact your doctor if:  · You notice more mucus or a change in the color of your mucus. · You need to use your antibiotic or steroid pills. · You do not get better as expected. Where can you learn more? Go to http://luis enrique-melinda.info/. Enter E949 in the search box to learn more about \"Chronic Obstructive Pulmonary Disease (COPD) Flare-Ups: Care Instructions. \"  Current as of: July 21, 2016  Content Version: 11.1  © 5908-1024 [employer]. Care instructions adapted under license by [employer] (which disclaims liability or warranty for this information). If you have questions about a medical condition or this instruction, always ask your healthcare professional. [address] any warranty or liability for your use of this information.

## 2017-03-24 NOTE — PROGRESS NOTES
HISTORY OF PRESENT ILLNESS  iMguel Pinedo is a 61 y.o. female. HPI  Upper respiratory illness:  Miguel Pinedo presents with complaints of sore throat, post nasal drip, cough described as harsh, barking, headache and hoarseness for 4 days. no nausea and no vomiting . she has not had  myalgias. Symptoms are moderate. Over-the-counter remedies including Mucinex has been used with poor relief of symptoms. Has been feeling short of breath and cough has been very harsh. Drinking plenty of fluids: yes  Asthma?:  No; has history of COPD but does not use inhalers  former smoker, quit 2 years ago  Contacts with similar infections: no     Complains of left foot pain after she jammed foot into wood cabinet on 3/21/17; has developed edema and bruising to left 2nd, 3rd and 4th toes. Having pain radiating into left forefoot and increased pain with weight bearing. On Insulin pump which is managed by endocrinology Dr Marta Menjivar and has been noting her blood sugars have been elevated over this past week since illness began. Has been adjusting her insulin pump to cover for elevated blood sugars. Review of Systems   Constitutional: Positive for chills and malaise/fatigue. Negative for fever. HENT: Positive for congestion and sore throat. Respiratory: Positive for cough, shortness of breath and wheezing. Negative for sputum production. Cardiovascular: Negative for chest pain and palpitations. Gastrointestinal: Negative for nausea and vomiting. Musculoskeletal: Positive for joint pain. Neurological: Positive for headaches. Negative for dizziness and tingling. /83 (BP 1 Location: Left arm, BP Patient Position: Sitting)  Pulse 95  Temp 98.5 °F (36.9 °C) (Oral)   Resp 18  Ht 5' 6\" (1.676 m)  Wt 145 lb 2 oz (65.8 kg)  SpO2 98%  BMI 23.42 kg/m2  Physical Exam   Constitutional: She is oriented to person, place, and time. She appears well-developed and well-nourished.    HENT:   Head: Normocephalic and atraumatic. Right Ear: External ear normal.   Left Ear: External ear normal.   Nose: Mucosal edema present. Right sinus exhibits no maxillary sinus tenderness. Left sinus exhibits no maxillary sinus tenderness. Mouth/Throat: Posterior oropharyngeal erythema present. Neck: Normal range of motion. Neck supple. No thyromegaly present. Cardiovascular: Normal rate and regular rhythm. Pulmonary/Chest: Effort normal. She has wheezes. Frequent harsh barking cough   Musculoskeletal:        Left foot: There is tenderness and swelling. Feet:    Lymphadenopathy:     She has no cervical adenopathy. Neurological: She is alert and oriented to person, place, and time. Psychiatric: She has a normal mood and affect. Her behavior is normal.   Nursing note and vitals reviewed. ASSESSMENT and PLAN  David Marie was seen today for cough and foot injury. Diagnoses and all orders for this visit:    Bronchitis  -     XR CHEST PA LAT; Future  -     chlorpheniramine-HYDROcodone (TUSSIONEX) 10-8 mg/5 mL suspension; Take 5 mL by mouth every twelve (12) hours as needed for Cough. Max Daily Amount: 10 mL.  -     azithromycin (ZITHROMAX) 250 mg tablet; Take 1 Tab by mouth See Admin Instructions for 5 days. Chronic obstructive pulmonary disease with acute exacerbation (HCC) -- Albuterol nebulizer treatment given in office with some improvement of wheezing.   -     albuterol (PROVENTIL VENTOLIN) 2.5 mg /3 mL (0.083 %) nebulizer solution; 3 mL by Nebulization route once for 1 dose. -     ALBUTEROL, INHAL. GMX.()  -     INHAL RX, AIRWAY OBST/DX SPUTUM INDUCT ([id number])  -     XR CHEST PA LAT; Future  -     predniSONE (DELTASONE) 20 mg tablet; Take 1 Tab by mouth two (2) times a day. -     albuterol (PROVENTIL HFA, VENTOLIN HFA, PROAIR HFA) 90 mcg/actuation inhaler;  Take 2 Puffs by inhalation every six (6) hours as needed for Wheezing.  -     albuterol (PROVENTIL VENTOLIN) 2.5 mg /3 mL (0.083 %) nebulizer solution; 3 mL by Nebulization route every four (4) hours as needed for Wheezing. Foot injury, left, initial encounter -- evaluate for possible fracture; advised to buddy tape toes together for stability  -     XR FOOT LT MIN 3 V; Future    Type 1 diabetes mellitus with stage 3 chronic kidney disease ([facility] [address] -- has been adjusting her insulin in pump to cover for elevated blood sugars.           lab results and schedule of future lab studies reviewed with patient  reviewed diet, exercise and weight control  reviewed medications and side effects in detail

## 2017-03-27 ENCOUNTER — OFFICE VISIT (OUTPATIENT)
Dept: INTERNAL MEDICINE CLINIC | Age: 57
End: 2017-03-27

## 2017-03-27 VITALS
HEART RATE: 93 BPM | BODY MASS INDEX: 22.47 KG/M2 | SYSTOLIC BLOOD PRESSURE: 144 MMHG | HEIGHT: 66 IN | DIASTOLIC BLOOD PRESSURE: 79 MMHG | OXYGEN SATURATION: 97 % | TEMPERATURE: 98 F | RESPIRATION RATE: 16 BRPM | WEIGHT: 139.8 LBS

## 2017-03-27 DIAGNOSIS — N18.30 TYPE 1 DIABETES MELLITUS WITH STAGE 3 CHRONIC KIDNEY DISEASE (HCC): Chronic | ICD-10-CM

## 2017-03-27 DIAGNOSIS — I10 ESSENTIAL HYPERTENSION: ICD-10-CM

## 2017-03-27 DIAGNOSIS — J44.1 CHRONIC OBSTRUCTIVE PULMONARY DISEASE WITH ACUTE EXACERBATION (HCC): Primary | Chronic | ICD-10-CM

## 2017-03-27 DIAGNOSIS — E10.22 TYPE 1 DIABETES MELLITUS WITH STAGE 3 CHRONIC KIDNEY DISEASE (HCC): Chronic | ICD-10-CM

## 2017-03-27 RX ORDER — ALBUTEROL SULFATE 0.83 MG/ML
2.5 SOLUTION RESPIRATORY (INHALATION)
Qty: 24 EACH | Refills: 1 | Status: SHIPPED | OUTPATIENT
Start: 2017-03-27 | End: 2017-08-21

## 2017-03-27 RX ORDER — LEVOFLOXACIN 500 MG/1
500 TABLET, FILM COATED ORAL DAILY
Qty: 10 TAB | Refills: 0 | Status: SHIPPED | OUTPATIENT
Start: 2017-03-27 | End: 2017-08-21

## 2017-03-27 NOTE — PROGRESS NOTES
HISTORY OF PRESENT ILLNESS  Lavinia Taylor is a 61 y.o. female. HPI     She complains of a cough for the past 6 days. Pt saw Parvin Nielsen NP on 3/23/17 with complaints of sore throat, shortness of breath, post nasal drip, cough, headache, and hoarseness. Chest x-ray ruled out pneumonia. She was given nebulizer treatment in the office on 3/23 that helped with her wheezing. Pt was prescribed Tussionex, Zpak, Prednisone, and Albuterol inhaler. Today pt states that her cough is now productive with yellow/green sputum. She finished Cramer Elizabeth today and only noticed slight improvement with this antibiotics. She has nebulizer at home although needs solution. Diabetic Review of Systems - medication compliance: compliant all of the time, home glucose monitoring: is performed regularly, further diabetic ROS: no polyuria or polydipsia, no chest pain, dyspnea or TIA's, no numbness, tingling or pain in extremities. Other symptoms and concerns: Reports Prednisone is increasing her blood sugar. Pt states sugars are between 380-560 and she is adjusting her insulin pump. Pt is not adjusting her pump too much because she is afraid to drop too low. She will see Dr. Rajendra Palomino today. Hypertension ROS: taking medications as instructed, no medication side effects noted, no TIA's, no chest pain on exertion, no dyspnea on exertion, no swelling of ankles. New concerns: Stable- bp was 144/79 in the office today. Her lipids were elevated in 01/2017 and Dr. Cole Dyson was suspicious for laboratory error. Lipids were repeated on 2/15/17 and decreased. Pt denies chest pain and states that she will heave echo performed tomorrow for routine cardiac eval.       Review of Systems   Respiratory: Positive for cough and sputum production. All other systems reviewed and are negative. Physical Exam   Constitutional: She is oriented to person, place, and time. She appears well-developed and well-nourished.    HENT:   Head: Normocephalic and atraumatic. Right Ear: External ear normal.   Left Ear: External ear normal.   Nose: Nose normal.   Mouth/Throat: Oropharynx is clear and moist.   Eyes: Conjunctivae and EOM are normal.   Neck: Normal range of motion. Neck supple. Carotid bruit is not present. No thyroid mass and no thyromegaly present. Cardiovascular: Normal rate, regular rhythm, S1 normal, S2 normal, normal heart sounds and intact distal pulses. Pulmonary/Chest: Effort normal and breath sounds normal.   Abdominal: Soft. Normal appearance and bowel sounds are normal. There is no hepatosplenomegaly. There is no tenderness. Musculoskeletal: Normal range of motion. Neurological: She is alert and oriented to person, place, and time. She has normal strength. No cranial nerve deficit or sensory deficit. Coordination normal.   Skin: Skin is warm, dry and intact. No abrasion and no rash noted. Psychiatric: She has a normal mood and affect. Her behavior is normal. Judgment and thought content normal.   Nursing note and vitals reviewed. ASSESSMENT and PLAN  Margie Lewis was seen today for cough. Diagnoses and all orders for this visit:    Chronic obstructive pulmonary disease with acute exacerbation ([facility] [address]  Pt took [place] with only slight relief therefore she will begin taking Levaquin. I sent in nebulizer solution and instructed pt to use nebulizer. Type 1 diabetes mellitus with stage 3 chronic kidney disease (HCC)  Pt's sugars are high and she is taking Prednisone. Pt is afraid to adjust insulin pump too much due to risk of dropping low- she will see Dr. Nohelia Ahumada today who will help her adjust her pump accordingly. Essential hypertension  BP is at goal. I do not recommend any change in medications. Other orders  -     levoFLOXacin (LEVAQUIN) 500 mg tablet; Take 1 Tab by mouth daily.   -     albuterol (PROVENTIL VENTOLIN) 2.5 mg /3 mL (0.083 %) nebulizer solution; 3 mL by Nebulization route every four (4) hours as needed for Wheezing. reviewed medications and side effects in detail     Written by Vivi Cam, as dictated by Beth Santos MD.     Current diagnosis and concerns discussed with pt at length. Understands risks and benefits or current treatment plan and medications and accepts the treatment and medication with any possible risks. Pt asks appropriate questions which were answered. Pt instructed to call with any concerns or problems.

## 2017-03-28 ENCOUNTER — TELEPHONE (OUTPATIENT)
Dept: CARDIOLOGY CLINIC | Age: 57
End: 2017-03-28

## 2017-03-28 ENCOUNTER — CLINICAL SUPPORT (OUTPATIENT)
Dept: CARDIOLOGY CLINIC | Age: 57
End: 2017-03-28

## 2017-03-28 DIAGNOSIS — N18.30 TYPE 1 DIABETES MELLITUS WITH STAGE 3 CHRONIC KIDNEY DISEASE (HCC): Chronic | ICD-10-CM

## 2017-03-28 DIAGNOSIS — E10.22 TYPE 1 DIABETES MELLITUS WITH STAGE 3 CHRONIC KIDNEY DISEASE (HCC): Chronic | ICD-10-CM

## 2017-03-28 DIAGNOSIS — I25.5 ISCHEMIC CARDIOMYOPATHY: Primary | ICD-10-CM

## 2017-03-28 DIAGNOSIS — I25.10 CORONARY ARTERY DISEASE INVOLVING NATIVE CORONARY ARTERY OF NATIVE HEART WITHOUT ANGINA PECTORIS: Chronic | ICD-10-CM

## 2017-03-28 DIAGNOSIS — E78.5 DYSLIPIDEMIA: ICD-10-CM

## 2017-03-28 NOTE — LETTER
3/30/2017 1:23 PM 
 
Ms. Liam Crawford [address] [facility] [address] Dr. Jacinta Aschoff, Patient is low cardia risk for gastric PM battery change under MAC anesthesia.  No additional cardiac testing is needed at this time. It is safe to stop antiplatelet therapy 5 days preop, resuming postop when safe to do so. Sincerely, Beverley Del Toro MD

## 2017-03-28 NOTE — TELEPHONE ENCOUNTER
Risk Stratification:    Last office visit:  Type of Surgery: Laparoscopic gastric pacemaker battery exchange. Type of anesthesia: MAC  Date of Surgery: 4/3/17  Surgeon: Danis Eng MD    Patient is taking aspirin 81mg. Fax to [phone].

## 2017-04-19 DIAGNOSIS — E03.9 UNSPECIFIED HYPOTHYROIDISM: ICD-10-CM

## 2017-04-19 RX ORDER — LEVOTHYROXINE SODIUM 175 UG/1
TABLET ORAL
Qty: 90 TAB | Refills: 1 | Status: SHIPPED | OUTPATIENT
Start: 2017-04-19 | End: 2018-09-18 | Stop reason: SDUPTHER

## 2017-08-16 RX ORDER — BUPROPION HYDROCHLORIDE 100 MG/1
TABLET ORAL
Qty: 60 TAB | Refills: 3 | Status: SHIPPED | OUTPATIENT
Start: 2017-08-16 | End: 2017-12-07 | Stop reason: SDUPTHER

## 2017-08-21 ENCOUNTER — OFFICE VISIT (OUTPATIENT)
Dept: CARDIOLOGY CLINIC | Age: 57
End: 2017-08-21

## 2017-08-21 VITALS
BODY MASS INDEX: 24.04 KG/M2 | DIASTOLIC BLOOD PRESSURE: 88 MMHG | WEIGHT: 149.6 LBS | RESPIRATION RATE: 16 BRPM | HEART RATE: 92 BPM | OXYGEN SATURATION: 99 % | HEIGHT: 66 IN | SYSTOLIC BLOOD PRESSURE: 150 MMHG

## 2017-08-21 DIAGNOSIS — E03.4 HYPOTHYROIDISM DUE TO ACQUIRED ATROPHY OF THYROID: ICD-10-CM

## 2017-08-21 DIAGNOSIS — N18.30 CKD (CHRONIC KIDNEY DISEASE), STAGE III (HCC): Chronic | ICD-10-CM

## 2017-08-21 DIAGNOSIS — I73.9 PAD (PERIPHERAL ARTERY DISEASE) (HCC): Chronic | ICD-10-CM

## 2017-08-21 DIAGNOSIS — J44.1 CHRONIC OBSTRUCTIVE PULMONARY DISEASE WITH ACUTE EXACERBATION (HCC): Chronic | ICD-10-CM

## 2017-08-21 DIAGNOSIS — N18.30 TYPE 1 DIABETES MELLITUS WITH STAGE 3 CHRONIC KIDNEY DISEASE (HCC): Chronic | ICD-10-CM

## 2017-08-21 DIAGNOSIS — D50.9 IRON DEFICIENCY ANEMIA, UNSPECIFIED IRON DEFICIENCY ANEMIA TYPE: ICD-10-CM

## 2017-08-21 DIAGNOSIS — E78.5 DYSLIPIDEMIA: ICD-10-CM

## 2017-08-21 DIAGNOSIS — I25.10 CORONARY ARTERY DISEASE INVOLVING NATIVE CORONARY ARTERY OF NATIVE HEART WITHOUT ANGINA PECTORIS: Primary | Chronic | ICD-10-CM

## 2017-08-21 DIAGNOSIS — I25.5 ISCHEMIC CARDIOMYOPATHY: ICD-10-CM

## 2017-08-21 DIAGNOSIS — E10.22 TYPE 1 DIABETES MELLITUS WITH STAGE 3 CHRONIC KIDNEY DISEASE (HCC): Chronic | ICD-10-CM

## 2017-08-21 NOTE — MR AVS SNAPSHOT
Visit Information Date & Time Provider Department Dept. Phone Encounter #  
 8/21/2017  9:40 AM Jovanni Davidson MD [facility] [phone] [id number] Follow-up Instructions Return in about 7 months (around 3/21/2018). Your Appointments 3/12/2018 10:00 AM  
ECHO CARDIOGRAMS 2D with ECHO, STFRANCIS  
[facility] ([facility] SCHEDULING) Appt Note: 6 mo fup echo at 10 at 6 dr Maria M Dash [address] [facility] [address]  
[phone]  
  
   
 [address] [facility] [address]  
  
    
 3/12/2018 11:00 AM  
ESTABLISHED PATIENT with Jovanni Davidosn MD  
[facility] ([facility]) Appt Note: 6 mo fup echo at 10 at 6 dr Maria M Dash [address] [place] [address] [address] [address] [address] Upcoming Health Maintenance Date Due Pneumococcal 19-64 Medium Risk (1 of 1 - PPSV23) 7/20/1978 FOBT Q 1 YEAR AGE 50-75 9/25/2015 EYE EXAM RETINAL OR DILATED Q1 10/6/2016 BREAST CANCER SCRN MAMMOGRAM 5/21/2017 HEMOGLOBIN A1C Q6M 7/25/2017 INFLUENZA AGE 9 TO ADULT 8/1/2017 FOOT EXAM Q1 1/25/2018 MICROALBUMIN Q1 1/25/2018 LIPID PANEL Q1 2/15/2018 PAP AKA CERVICAL CYTOLOGY 8/6/2018 DTaP/Tdap/Td series (2 - Td) 7/30/2023 Allergies as of 8/21/2017  Review Complete On: 8/21/2017 By: Shemar Mckeon Severity Noted Reaction Type Reactions Compazine [Prochlorperazine Edisylate]  02/21/2017    Rash Percocet [Oxycodone-acetaminophen]  06/28/2015    Nausea Only ++ Hydrocodone (Norco/vicodin), Morphine, Hydromorphone (Dialudid) are okay per patient ++ Current Immunizations  Reviewed on 10/22/2014 Name Date Influenza Vaccine 11/10/2016, 10/1/2015 Tdap 7/30/2013  8:54 PM  
  
 Not reviewed this visit You Were Diagnosed With   
  
 Codes Comments Coronary artery disease involving native coronary artery of native heart without angina pectoris    -  Primary ICD-10-CM: I25.10 ICD-9-CM: 414.01 Ischemic cardiomyopathy     ICD-10-CM: I25.5 ICD-9-CM: 414.8 PAD (peripheral artery disease) ([facility])     ICD-10-CM: I73.9 ICD-9-CM: 443. 9 Type 1 diabetes mellitus with stage 3 chronic kidney disease (HCC)     ICD-10-CM: E10.22, N18.3 ICD-9-CM: 250.41, 585.3 Chronic obstructive pulmonary disease with acute exacerbation (HCC)     ICD-10-CM: J44.1 ICD-9-CM: 491.21 CKD (chronic kidney disease), stage III     ICD-10-CM: N18.3 ICD-9-CM: 638. 3 Iron deficiency anemia, unspecified iron deficiency anemia type     ICD-10-CM: D50.9 ICD-9-CM: 280.9 Dyslipidemia     ICD-10-CM: E78.5 ICD-9-CM: 272.4 Hypothyroidism due to acquired atrophy of thyroid     ICD-10-CM: E03.4 ICD-9-CM: 244.8, 246.8 Vitals BP Pulse Resp Height(growth percentile) Weight(growth percentile) SpO2  
 150/88 (BP 1 Location: Left arm, BP Patient Position: Sitting) 92 16 5' 6\" (1.676 m) 149 lb 9.6 oz (67.9 kg) 99% BMI OB Status Smoking Status 24.15 kg/m2 Postmenopausal Former Smoker Vitals History BMI and BSA Data Body Mass Index Body Surface Area  
 24.15 kg/m 2 1.78 m 2 Preferred Pharmacy Pharmacy Name [facility], [address] Your Updated Medication List  
  
   
This list is accurate as of: 8/21/17 10:26 AM.  Always use your most recent med list.  
  
  
  
  
  insulin pump Misc Commonly known as:  PATIENT SUPPLIED  
as needed. Humalog insulin pump basal + bolus * albuterol 90 mcg/actuation inhaler Commonly known as:  PROVENTIL HFA, VENTOLIN HFA, PROAIR HFA Take 2 Puffs by inhalation every six (6) hours as needed for Wheezing. * albuterol 2.5 mg /3 mL (0.083 %) nebulizer solution Commonly known as:  PROVENTIL VENTOLIN  
3 mL by Nebulization route every four (4) hours as needed for Wheezing. aspirin 81 mg chewable tablet Take 81 mg by mouth daily. buPROPion 100 mg tablet Commonly known as:  WELLBUTRIN  
TAKE ONE TABLET BY MOUTH TWO TIMES A DAY  
  
 carvedilol 6.25 mg tablet Commonly known as:  COREG  
TAKE 1 TABLET BY MOUTH EVERY MORNING & TAKE 2 TABLETS BY MOUTH EVERY EVENING  
  
 chlorpheniramine-HYDROcodone 10-8 mg/5 mL suspension Commonly known as:  [place] Take 5 mL by mouth every twelve (12) hours as needed for Cough. Max Daily Amount: 10 mL. glucose blood VI test strips strip Commonly known as:  Ascensia CONTOUR To test daily blood sugars. insulin lispro 100 unit/mL injection Commonly known as:  HumaLOG  
1 Units by SubCUTAneous route continuous. To use for insulin pump. Lancets Misc Onetouch Ultrasoft lancets  
  
 levothyroxine 175 mcg tablet Commonly known as:  SYNTHROID  
TAKE 1 TABLET BY MOUTH EVERY DAY  
  
 MUCINEX 1,200 mg Ta12 ER tablet Generic drug:  guaiFENesin Take 1,200 mg by mouth two (2) times a day. nitroglycerin 0.4 mg SL tablet Commonly known as:  NITROSTAT  
1 Tab by SubLINGual route every five (5) minutes as needed for Chest Pain. Indications: ANGINA  
  
 omeprazole-sodium bicarbonate 40-1.1 mg-gram capsule Commonly known as:  ZEGERID  
  
 RANEXA 500 mg SR tablet Generic drug:  ranolazine ER  
TAKE 1 TABLET BY MOUTH TWO TIMES A DAY  
  
 VITAMIN D3 2,000 unit Tab Generic drug:  cholecalciferol (vitamin D3) Take  by mouth. * Notice: This list has 2 medication(s) that are the same as other medications prescribed for you. Read the directions carefully, and ask your doctor or other care provider to review them with you. Follow-up Instructions Return in about 7 months (around 3/21/2018). To-Do List   
 Around 08/21/2017 ECHO:  2D ECHO COMPLETE ADULT (TTE) W OR WO CONTR Patient Instructions We are going to increased your Coreg to 12.5 mg twice daily - take 2 of your 6.25 mg tablets until you run out. Call our office when you are nearing a refill and we will submit the higher dose to your pharmacy. Buy a blood pressure cuff and start checking your blood pressure at home. Keep a log of your readings with you to bring with you to your next office visit. Check 2 hours after morning medications or always check if you don't feel right to make sure you aren't having low blood pressure. Call our office if top BP number is <100 or 160 consistently. Continue to remain smoke free. You are doing an amazing job at this. Call us with any questions or concerns prior to your next office visit. Introducing [facility]! Dear Fina Cummings: Thank you for requesting a [employer] account. Our records indicate that you have previously registered for a [employer] account but its currently inactive. Please call our [employer] support line at [phone]. Additional Information If you have questions, please visit the Frequently Asked Questions section of the [employer] website at [email or web address] Remember, [employer] is NOT to be used for urgent needs. For medical emergencies, dial 911. Now available from your iPhone and Android! Please provide this summary of care documentation to your next provider. Your primary care clinician is listed as Edith Hebert. If you have any questions after today's visit, please call [phone].

## 2017-08-21 NOTE — PATIENT INSTRUCTIONS
We are going to increased your Coreg to 12.5 mg twice daily - take 2 of your 6.25 mg tablets until you run out. Call our office when you are nearing a refill and we will submit the higher dose to your pharmacy. Buy a blood pressure cuff and start checking your blood pressure at home. Keep a log of your readings with you to bring with you to your next office visit. Check 2 hours after morning medications or always check if you don't feel right to make sure you aren't having low blood pressure. Call our office if top BP number is <100 or 160 consistently. Continue to remain smoke free. You are doing an amazing job at this. Call us with any questions or concerns prior to your next office visit.

## 2017-08-21 NOTE — PROGRESS NOTES
History of Present Illness  Olga Rider is a 61 y.o. female. Last seen 6 months ago. Problem List  Date Reviewed: 8/21/2017          Codes Class Noted    Dyslipidemia ICD-10-CM: E78.5  ICD-9-CM: 272.4  5/31/2016        Hypothyroidism ICD-10-CM: E03.9  ICD-9-CM: 244.9  2/23/2016        Ischemic cardiomyopathy ICD-10-CM: I25.5  ICD-9-CM: 414.8  11/2/2014        CAD (coronary artery disease), native coronary artery (Chronic) ICD-10-CM: I25.10  ICD-9-CM: 414.01  11/2/2014        PAD (peripheral artery disease) (HCC) (Chronic) ICD-10-CM: I73.9  ICD-9-CM: 443.9  10/6/2014        DM type 1 causing renal disease (HCC) (Chronic) ICD-10-CM: E10.29  ICD-9-CM: 250.41  10/6/2014        CKD (chronic kidney disease), stage III (Chronic) ICD-10-CM: N18.3  ICD-9-CM: 585.3  9/25/2014        Anemia, iron deficiency ICD-10-CM: D50.9  ICD-9-CM: 280.9  9/25/2014        COPD (chronic obstructive pulmonary disease) (HCC) (Chronic) ICD-10-CM: J44.9  ICD-9-CM: 415  12/17/2009            Cardiac Testing  ECHO: 9/22/2014: EF 40%, mild diffuse HK, mild MR, TR   Cath 10/14/2014 - EDP 3, no AV gradient, LVEF 40-45%, inferior AK, mod cor Ca2+, LM mild plaque, LAD mild prox plaque, apical 85%, D1 diffuse 75%, LCX mid 90% involving distal OM, RCA prox 100% with L to R collaterals  Carotid duplex 11/5/14 - 0-9% left, 10-49% right  Angio 11/19/14 - patent stents on right with 3 vessel runoff, left mid SFA occlusion with 1 vessel runoff   11/23/15 Echo - akinesis of inferior and inferolateral walls, EF 30-35%  11/23/15 Carotid duplex - 0-9% right internal carotid, 10-49% left internal carotid. No significant change since 11/2014. Echo 2/19/16 - EF 40%. with mild lateral hypokinesis and severe posterior/inferior hypokinesis. Grade 1 diastolic dysfunction. Mild LAE. LHC 2/22/16 - Proximal LAD 40%, Mid LAD 50%, Distal LAD 90%. Echo 3/28/17 - EF 40 %.  Akinesis of the basal-mid inferoseptal, basal-mid inferior, and basal-mid inferolateral wall(s). Mild MR. No significant change when compared to study  20-Feb-2016. HPI  Ms. Quezada Grandchild denies any significant interval issues. She reports interval improvement in nausea and vomiting, now s/p battery change of gastric stimulator. Weight has increased slightly due to improved dietary intake. Her activity is limited by chronic fatigue. She denies any exertional angina or dyspnea. Compliant with Ranexa, ASA and Coreg. She denies any palpitations, syncope, orthopnea, edema or paroxysmal nocturnal dyspnea. She denies any progression in mild claudication symptoms. She continues to remain smoke free. She continues to wear an insulin pump. DM followed by Dr. Delmy Coburn. Current Outpatient Prescriptions   Medication Sig    buPROPion (WELLBUTRIN) 100 mg tablet TAKE ONE TABLET BY MOUTH TWO TIMES A DAY    levothyroxine (SYNTHROID) 175 mcg tablet TAKE 1 TABLET BY MOUTH EVERY DAY    omeprazole-sodium bicarbonate (ZEGERID) 40-1.1 mg-gram capsule     guaiFENesin (MUCINEX) 1,200 mg Ta12 ER tablet Take 1,200 mg by mouth two (2) times a day.  albuterol (PROVENTIL HFA, VENTOLIN HFA, PROAIR HFA) 90 mcg/actuation inhaler Take 2 Puffs by inhalation every six (6) hours as needed for Wheezing.  chlorpheniramine-HYDROcodone (TUSSIONEX) 10-8 mg/5 mL suspension Take 5 mL by mouth every twelve (12) hours as needed for Cough. Max Daily Amount: 10 mL.  albuterol (PROVENTIL VENTOLIN) 2.5 mg /3 mL (0.083 %) nebulizer solution 3 mL by Nebulization route every four (4) hours as needed for Wheezing.  Lancets [facility] Onetouch Ultrasoft lancets    cholecalciferol, vitamin D3, (VITAMIN D3) 2,000 unit tab Take  by mouth.  insulin lispro (HUMALOG) 100 unit/mL injection 1 Units by SubCUTAneous route continuous. To use for insulin pump.  glucose blood VI test strips (ASCENSIA CONTOUR) strip To test daily blood sugars.     RANEXA 500 mg SR tablet TAKE 1 TABLET BY MOUTH TWO TIMES A DAY    carvedilol (COREG) 6.25 mg tablet TAKE 1 TABLET BY MOUTH EVERY MORNING & TAKE 2 TABLETS BY MOUTH EVERY EVENING    nitroglycerin (NITROSTAT) 0.4 mg SL tablet 1 Tab by SubLINGual route every five (5) minutes as needed for Chest Pain. Indications: ANGINA    aspirin 81 mg chewable tablet Take 81 mg by mouth daily.   insulin pump (PATIENT SUPPLIED) misc as needed. Humalog insulin pump basal + bolus     No current facility-administered medications for this visit. Social History     Social History    Marital status:      Spouse name: N/A    Number of children: N/A    Years of education: N/A     Occupational History    Not on file. Social History Main Topics    Smoking status: Former Smoker     Packs/day: 0.50     Years: 35.00     Types: Cigarettes     Quit date: 9/21/2014    Smokeless tobacco: Never Used    Alcohol use No    Drug use: No    Sexual activity: No     Other Topics Concern    Not on file     Social History Narrative     Review of Systems  Constitutional: Negative for fever, chills, and diaphoresis. Positive for fatigue. Respiratory: Negative for cough, hemoptysis, sputum production, shortness of breath and wheezing. Cardiovascular: Negative for chest pain, palpitations, orthopnea, leg swelling and PND. Positive for mild claudication sxs. Gastrointestinal: Negative for heartburn, blood in stool and melena. Genitourinary: Negative for dysuria and flank pain. Musculoskeletal: Negative for joint pain and back pain. Skin: Negative for rash. Neurological: Negative for focal weakness, seizures, loss of consciousness, weakness and headaches. Endo/Heme/Allergies: Does not bruise/bleed easily. Psychiatric/Behavioral: Negative for memory loss.       Visit Vitals    /88 (BP 1 Location: Left arm, BP Patient Position: Sitting)    Pulse 92    Resp 16    Ht 5' 6\" (1.676 m)    Wt 149 lb 9.6 oz (67.9 kg)    SpO2 99%    BMI 24.15 kg/m2     Wt Readings from Last 3 Encounters:   08/21/17 149 lb 9.6 oz (67.9 kg)   03/27/17 139 lb 12.8 oz (63.4 kg)   03/23/17 145 lb 2 oz (65.8 kg)     Lab Results   Component Value Date/Time    Cholesterol, total 242 02/15/2017 11:30 AM    HDL Cholesterol 91 02/15/2017 11:30 AM    LDL, calculated 128 02/15/2017 11:30 AM    VLDL, calculated 23 02/15/2017 11:30 AM    Triglyceride 116 02/15/2017 11:30 AM    CHOL/HDL Ratio 2.4 02/20/2016 08:25 AM     Physical Exam  General - well developed well nourished  Neck - JVP normal, thyroid normal  Cardiac - normal S1,S2, no murmurs, rubs or gallops. No clicks  Vascular - carotids without bruits, radials, femorals and pedal pulses equal bilateral  Lungs - clear to auscultation bilaterals, no rales, wheezing or rhonchi  Abd - soft nontender, no HSM, no abd bruits  Extremities - no edema, warm. Skin - no rash  Neuro - nonfocal  Psych - normal mood and affect    Cardiographics  EKG 10/6/14 - Sinus at 90 bpm, nonspecific ST changes  EKG 10/30/14 - sinus, normal EKG  EKG 2/15/17 -     ASSESSMENT and PLAN  Encounter Diagnoses   Name Primary?  Coronary artery disease involving native coronary artery of native heart without angina pectoris Yes    Ischemic cardiomyopathy     [facility]     Type 1 diabetes mellitus with stage 3 chronic kidney disease (HCC)     Chronic obstructive pulmonary disease with acute exacerbation ([facility])     CKD (chronic kidney disease), stage III     Iron deficiency anemia, unspecified iron deficiency anemia type     Dyslipidemia     Hypothyroidism due to acquired atrophy of thyroid      Ms. Arnoldo Montgomery has diffuse atherosclerosis involving her coronary, carotid, and lower extremity circulation in the setting of T1DM. She has multivessel CAD with NSTEMI 2/19/16. She is felt to be best suited for continued medical therapy. She has derived benefit from Ranexa 1000 mg BID. Continue current regimen. Mild LV dysfunction by Echo 2/19/16, EF 40%.  Repeat limited study again 3/28/17 showed EF again at 40%. No HF sxs on no diuretic regimen. Plan to increase Coreg to 12.5 mg BID now to optimize HF regimen and address mildly elevated BP during visit today. Advised her to obtain BP cuff for home and begin monitoring daily. Discussed goal parameters. Plan to repeat Echo again in 6 months with return office visit. Chronic left leg claudication in the setting of diffuse PAD involving the left iliac circulation. Continue medical therapy. I encouraged her to stay smoke free, continue ASA and to exercise regularly. Repeat lipid studies 2/2017 showed an improvement compared to 1/2017, when a lab error was suspected. Plan to repeat fasting lipids again in the near future. Phone follow up to review results. The patient was encouraged to continue current medications, exercise as she's able and call with any new complaints or concerns. Follow-up Disposition:  Return in about 7 months (around 3/21/2018).      TRISHA Josue MD

## 2017-08-24 DIAGNOSIS — I25.10 CORONARY ARTERY DISEASE INVOLVING NATIVE CORONARY ARTERY OF NATIVE HEART WITHOUT ANGINA PECTORIS: Primary | Chronic | ICD-10-CM

## 2017-09-13 RX ORDER — ROSUVASTATIN CALCIUM 10 MG/1
TABLET, COATED ORAL
Qty: 90 TAB | Refills: 0 | Status: SHIPPED | OUTPATIENT
Start: 2017-09-13 | End: 2017-12-07 | Stop reason: SDUPTHER

## 2017-10-24 ENCOUNTER — OFFICE VISIT (OUTPATIENT)
Dept: INTERNAL MEDICINE CLINIC | Age: 57
End: 2017-10-24

## 2017-10-24 VITALS
DIASTOLIC BLOOD PRESSURE: 84 MMHG | WEIGHT: 155.8 LBS | OXYGEN SATURATION: 99 % | RESPIRATION RATE: 14 BRPM | TEMPERATURE: 97.5 F | HEIGHT: 66 IN | HEART RATE: 86 BPM | BODY MASS INDEX: 25.04 KG/M2 | SYSTOLIC BLOOD PRESSURE: 159 MMHG

## 2017-10-24 DIAGNOSIS — M25.531 RIGHT WRIST PAIN: Primary | ICD-10-CM

## 2017-10-24 DIAGNOSIS — E10.22 TYPE 1 DIABETES MELLITUS WITH STAGE 3 CHRONIC KIDNEY DISEASE (HCC): Chronic | ICD-10-CM

## 2017-10-24 DIAGNOSIS — N18.30 TYPE 1 DIABETES MELLITUS WITH STAGE 3 CHRONIC KIDNEY DISEASE (HCC): Chronic | ICD-10-CM

## 2017-10-24 DIAGNOSIS — E78.5 DYSLIPIDEMIA: ICD-10-CM

## 2017-10-24 DIAGNOSIS — I25.10 CORONARY ARTERY DISEASE INVOLVING NATIVE CORONARY ARTERY OF NATIVE HEART WITHOUT ANGINA PECTORIS: Chronic | ICD-10-CM

## 2017-10-24 RX ORDER — LISINOPRIL 5 MG/1
TABLET ORAL
COMMUNITY
Start: 2017-10-24 | End: 2018-06-06 | Stop reason: ALTCHOICE

## 2017-10-24 NOTE — PROGRESS NOTES
HISTORY OF PRESENT ILLNESS  Lyn Rizvi is a 61 y.o. female. HPI   Patient reports episode of low blood sugar about 2 weeks ago. She states her  found her on the floor. Patient reports pain in right wrist and shoulder. She notes loss of strength in right wrist.   Diabetic Review of Systems - medication compliance: compliant all of the time, diabetic diet compliance: compliant all of the time, home glucose monitoring: is performed regularly, further diabetic ROS: no polyuria or polydipsia, no chest pain, dyspnea or TIA's, no numbness, tingling or pain in extremities. Other symptoms and concerns: Patient states her sugars are between 120-150 aside from the occasional low. Patient continues to follow up with endocrinology. Dyslipidemia:  Cardiovascular risk analysis - 61 y.o. female LDL goal is under 130. ROS: taking medications as instructed, no medication side effects noted, no TIA's, no chest pain on exertion, no dyspnea on exertion, no swelling of ankles. Tolerating meds, no myalgias or other side effects noted  New concerns: Last LDL was 128 and total cholesterol was 242 on 2/2017. Review of Systems   All other systems reviewed and are negative. Physical Exam   Constitutional: She is oriented to person, place, and time. She appears well-developed and well-nourished. HENT:   Head: Normocephalic and atraumatic. Right Ear: External ear normal.   Left Ear: External ear normal.   Nose: Nose normal.   Mouth/Throat: Oropharynx is clear and moist.   Eyes: Conjunctivae and EOM are normal.   Neck: Normal range of motion. Neck supple. Carotid bruit is not present. No thyroid mass and no thyromegaly present. Cardiovascular: Normal rate, regular rhythm, S1 normal, S2 normal, normal heart sounds and intact distal pulses. Pulmonary/Chest: Effort normal and breath sounds normal.   Abdominal: Soft. Normal appearance and bowel sounds are normal. There is no hepatosplenomegaly.  There is no tenderness. Musculoskeletal: Normal range of motion. Limited ROM of right shoulder, decreased strength in right wrist. Tenderness of right wrist.    Neurological: She is alert and oriented to person, place, and time. She has normal strength. No cranial nerve deficit or sensory deficit. Coordination normal.   Skin: Skin is warm, dry and intact. No abrasion and no rash noted. Psychiatric: She has a normal mood and affect. Her behavior is normal. Judgment and thought content normal.   Nursing note and vitals reviewed. ASSESSMENT and PLAN  Diagnoses and all orders for this visit:    1. Right wrist pain  Concerned for frozen shoulder and chipped bone in right wrist. Instructed patient to follow up with orthopedic. We will make an appointment    2. Type 1 diabetes mellitus with stage 3 chronic kidney disease ([facility] [address]  Patient has occasional low but is managing sugars well. Continue current medication regimen and continue to watch diet. Patient will continue to follow up with endocrinology. -     CBC W/O DIFF  -     METABOLIC PANEL, COMPREHENSIVE  -     HEMOGLOBIN A1C WITH EAG  -     MICROALBUMIN, UR, RAND    3. Coronary artery disease involving native coronary artery of native heart without angina pectoris  Stable, will monitor.   -     LIPID PANEL    4. Dyslipidemia  Stable, patient tolerating meds, no myalgias. I do not recommend any change in medications. lab results and schedule of future lab studies reviewed with patient  reviewed diet, exercise and weight control    Written by Pinky Valle, as dictated by Gretchen Huang MD.     Current diagnosis and concerns discussed with pt at length. Understands risks and benefits or current treatment plan and medications and accepts the treatment and medication with any possible risks. Pt asks appropriate questions which were answered. Pt instructed to call with any concerns or problems.

## 2017-10-24 NOTE — Clinical Note
I think I told you she needs ortho appt with hand as she fell after becoming hypoglycemic with point tenderness

## 2017-10-25 ENCOUNTER — DOCUMENTATION ONLY (OUTPATIENT)
Dept: INTERNAL MEDICINE CLINIC | Age: 57
End: 2017-10-25

## 2017-10-25 LAB
ALBUMIN SERPL-MCNC: 4.6 G/DL (ref 3.5–5.5)
ALBUMIN/GLOB SERPL: 1.6 {RATIO} (ref 1.2–2.2)
ALP SERPL-CCNC: 94 IU/L (ref 39–117)
ALT SERPL-CCNC: 20 IU/L (ref 0–32)
AST SERPL-CCNC: 27 IU/L (ref 0–40)
BILIRUB SERPL-MCNC: 0.4 MG/DL (ref 0–1.2)
BUN SERPL-MCNC: 35 MG/DL (ref 6–24)
BUN/CREAT SERPL: 24 (ref 9–23)
CALCIUM SERPL-MCNC: 10 MG/DL (ref 8.7–10.2)
CHLORIDE SERPL-SCNC: 100 MMOL/L (ref 96–106)
CHOLEST SERPL-MCNC: 272 MG/DL (ref 100–199)
CO2 SERPL-SCNC: 24 MMOL/L (ref 18–29)
CREAT SERPL-MCNC: 1.46 MG/DL (ref 0.57–1)
ERYTHROCYTE [DISTWIDTH] IN BLOOD BY AUTOMATED COUNT: 17.4 % (ref 12.3–15.4)
EST. AVERAGE GLUCOSE BLD GHB EST-MCNC: 209 MG/DL
GFR SERPLBLD CREATININE-BSD FMLA CKD-EPI: 39 ML/MIN/1.73
GFR SERPLBLD CREATININE-BSD FMLA CKD-EPI: 45 ML/MIN/1.73
GLOBULIN SER CALC-MCNC: 2.9 G/DL (ref 1.5–4.5)
GLUCOSE SERPL-MCNC: 112 MG/DL (ref 65–99)
HBA1C MFR BLD: 8.9 % (ref 4.8–5.6)
HCT VFR BLD AUTO: 41.5 % (ref 34–46.6)
HDLC SERPL-MCNC: 99 MG/DL
HGB BLD-MCNC: 13.7 G/DL (ref 11.1–15.9)
INTERPRETATION, 910389: NORMAL
INTERPRETATION: NORMAL
LDLC SERPL CALC-MCNC: 155 MG/DL (ref 0–99)
MCH RBC QN AUTO: 29.3 PG (ref 26.6–33)
MCHC RBC AUTO-ENTMCNC: 33 G/DL (ref 31.5–35.7)
MCV RBC AUTO: 89 FL (ref 79–97)
MICROALBUMIN UR-MCNC: 746.9 UG/ML
PDF IMAGE, 910387: NORMAL
PLATELET # BLD AUTO: 318 X10E3/UL (ref 150–379)
POTASSIUM SERPL-SCNC: 4.7 MMOL/L (ref 3.5–5.2)
PROT SERPL-MCNC: 7.5 G/DL (ref 6–8.5)
RBC # BLD AUTO: 4.67 X10E6/UL (ref 3.77–5.28)
SODIUM SERPL-SCNC: 141 MMOL/L (ref 134–144)
TRIGL SERPL-MCNC: 91 MG/DL (ref 0–149)
VLDLC SERPL CALC-MCNC: 18 MG/DL (ref 5–40)
WBC # BLD AUTO: 9.7 X10E3/UL (ref 3.4–10.8)

## 2017-12-08 RX ORDER — ROSUVASTATIN CALCIUM 10 MG/1
TABLET, COATED ORAL
Qty: 90 TAB | Refills: 1 | Status: SHIPPED | OUTPATIENT
Start: 2017-12-08 | End: 2018-06-07 | Stop reason: SDUPTHER

## 2017-12-08 RX ORDER — BUPROPION HYDROCHLORIDE 100 MG/1
TABLET ORAL
Qty: 180 TAB | Refills: 1 | Status: SHIPPED | OUTPATIENT
Start: 2017-12-08 | End: 2018-06-07 | Stop reason: SDUPTHER

## 2018-01-09 RX ORDER — CARVEDILOL 6.25 MG/1
TABLET ORAL
Qty: 270 TAB | Refills: 3 | Status: SHIPPED | OUTPATIENT
Start: 2018-01-09 | End: 2018-12-21 | Stop reason: SDUPTHER

## 2018-01-09 RX ORDER — RANOLAZINE 500 MG/1
TABLET, FILM COATED, EXTENDED RELEASE ORAL
Qty: 180 TAB | Refills: 3 | Status: SHIPPED | OUTPATIENT
Start: 2018-01-09 | End: 2018-12-21 | Stop reason: SDUPTHER

## 2018-05-11 ENCOUNTER — TELEPHONE (OUTPATIENT)
Dept: INTERNAL MEDICINE CLINIC | Age: 58
End: 2018-05-11

## 2018-05-11 ENCOUNTER — TELEPHONE (OUTPATIENT)
Dept: CARDIOLOGY CLINIC | Age: 58
End: 2018-05-11

## 2018-05-11 NOTE — TELEPHONE ENCOUNTER
Return call to pt, pt stated she feels dizzy and her HR is in the high 90s. Pt report bp reading of 150s/80s. Ask if pt is having any chest pain, pt denied chest pain or shortness of breath, ask pt to reach out to cardiologist, pt verbalized understanding.

## 2018-05-11 NOTE — TELEPHONE ENCOUNTER
Patient is requesting to speak with the nurse for Berdie Jobs before the end of the day. States she's had a awful headache all day and threw up her cereal. Patient doesn't know what to do. Reports her BS levels have been fine , 74 this am and 98 when she checked a little while ago.    She can be reached at [phone]

## 2018-06-06 ENCOUNTER — CLINICAL SUPPORT (OUTPATIENT)
Dept: CARDIOLOGY CLINIC | Age: 58
End: 2018-06-06

## 2018-06-06 ENCOUNTER — OFFICE VISIT (OUTPATIENT)
Dept: CARDIOLOGY CLINIC | Age: 58
End: 2018-06-06

## 2018-06-06 VITALS
OXYGEN SATURATION: 96 % | HEART RATE: 85 BPM | DIASTOLIC BLOOD PRESSURE: 84 MMHG | SYSTOLIC BLOOD PRESSURE: 164 MMHG | BODY MASS INDEX: 24.11 KG/M2 | WEIGHT: 150 LBS | HEIGHT: 66 IN

## 2018-06-06 DIAGNOSIS — E10.22 TYPE 1 DIABETES MELLITUS WITH STAGE 3 CHRONIC KIDNEY DISEASE (HCC): Chronic | ICD-10-CM

## 2018-06-06 DIAGNOSIS — I73.9 PAD (PERIPHERAL ARTERY DISEASE) (HCC): Chronic | ICD-10-CM

## 2018-06-06 DIAGNOSIS — E03.4 HYPOTHYROIDISM DUE TO ACQUIRED ATROPHY OF THYROID: ICD-10-CM

## 2018-06-06 DIAGNOSIS — J44.1 CHRONIC OBSTRUCTIVE PULMONARY DISEASE WITH ACUTE EXACERBATION (HCC): Chronic | ICD-10-CM

## 2018-06-06 DIAGNOSIS — D50.9 IRON DEFICIENCY ANEMIA, UNSPECIFIED IRON DEFICIENCY ANEMIA TYPE: ICD-10-CM

## 2018-06-06 DIAGNOSIS — I25.5 ISCHEMIC CARDIOMYOPATHY: Primary | ICD-10-CM

## 2018-06-06 DIAGNOSIS — E78.5 DYSLIPIDEMIA: ICD-10-CM

## 2018-06-06 DIAGNOSIS — I25.5 ISCHEMIC CARDIOMYOPATHY: ICD-10-CM

## 2018-06-06 DIAGNOSIS — N18.30 CKD (CHRONIC KIDNEY DISEASE), STAGE III (HCC): Chronic | ICD-10-CM

## 2018-06-06 DIAGNOSIS — I25.10 CORONARY ARTERY DISEASE INVOLVING NATIVE CORONARY ARTERY OF NATIVE HEART WITHOUT ANGINA PECTORIS: ICD-10-CM

## 2018-06-06 DIAGNOSIS — N18.30 TYPE 1 DIABETES MELLITUS WITH STAGE 3 CHRONIC KIDNEY DISEASE (HCC): Chronic | ICD-10-CM

## 2018-06-06 DIAGNOSIS — I25.10 CORONARY ARTERY DISEASE INVOLVING NATIVE CORONARY ARTERY, ANGINA PRESENCE UNSPECIFIED, UNSPECIFIED WHETHER NATIVE OR TRANSPLANTED HEART: Primary | Chronic | ICD-10-CM

## 2018-06-06 NOTE — MR AVS SNAPSHOT
[address] [id number] Patient: Paulina Lowery MRN: [id number] Visit Information Date & Time Provider Department Dept. Phone Encounter #  
 6/6/2018 11:00 AM Tequila Milligan MD [facility] Nelida Lau [phone] [id number] Follow-up Instructions Return in about 4 weeks (around 7/4/2018). Upcoming Health Maintenance Date Due Pneumococcal 19-64 Medium Risk (1 of 1 - PPSV23) 7/20/1978 FOBT Q 1 YEAR AGE 50-75 9/25/2015 EYE EXAM RETINAL OR DILATED Q1 10/6/2016 BREAST CANCER SCRN MAMMOGRAM 5/21/2017 FOOT EXAM Q1 1/25/2018 MEDICARE YEARLY EXAM 4/12/2018 HEMOGLOBIN A1C Q6M 4/24/2018 PAP AKA CERVICAL CYTOLOGY 8/6/2018 Influenza Age 5 to Adult 8/1/2018 MICROALBUMIN Q1 10/24/2018 LIPID PANEL Q1 10/24/2018 DTaP/Tdap/Td series (2 - Td) 7/30/2023 Allergies as of 6/6/2018  Review Complete On: 6/6/2018 By: Ly Jaffe NP Severity Noted Reaction Type Reactions Compazine [Prochlorperazine Edisylate]  02/21/2017    Rash Percocet [Oxycodone-acetaminophen]  06/28/2015    Nausea Only ++ Hydrocodone (Norco/vicodin), Morphine, Hydromorphone (Dialudid) are okay per patient ++ Current Immunizations  Reviewed on 10/22/2014 Name Date Influenza Vaccine 10/21/2017, 11/10/2016, 10/1/2015 Tdap 7/30/2013  8:54 PM  
  
 Not reviewed this visit You Were Diagnosed With   
  
 Codes Comments Coronary artery disease involving native coronary artery, angina presence unspecified, unspecified whether native or transplanted heart    -  Primary ICD-10-CM: I25.10 ICD-9-CM: 414.01   
 [facility]peripheral [facility]     ICD-10-CM: I73.9 ICD-9-CM: 443.9 Ischemic cardiomyopathy     ICD-10-CM: I25.5 ICD-9-CM: 414.8 Type 1 diabetes mellitus with stage 3 chronic kidney disease (HCC)     ICD-10-CM: E10.22, N18.3 ICD-9-CM: 250.41, 585.3 CKD (chronic kidney disease), stage III     ICD-10-CM: N18.3 ICD-9-CM: 434. 3 Dyslipidemia     ICD-10-CM: E78.5 ICD-9-CM: 272.4 Iron deficiency anemia, unspecified iron deficiency anemia type     ICD-10-CM: D50.9 ICD-9-CM: 280. 9 Chronic obstructive pulmonary disease with acute exacerbation (HCC)     ICD-10-CM: J44.1 ICD-9-CM: 491.21 Hypothyroidism due to acquired atrophy of thyroid     ICD-10-CM: E03.4 ICD-9-CM: 244.8, 246.8 Vitals BP Pulse Height(growth percentile) Weight(growth percentile) SpO2 BMI  
 164/84 85 5' 6\" (1.676 m) 150 lb (68 kg) 96% 24.21 kg/m2 OB Status Smoking Status Postmenopausal Former Smoker Vitals History BMI and BSA Data Body Mass Index Body Surface Area  
 24.21 kg/m 2 1.78 m 2 Preferred Pharmacy Pharmacy Name Phone [id number] [address] [phone] Your Updated Medication List  
  
   
This list is accurate as of 6/6/18 11:31 AM.  Always use your most recent med list.  
  
  
  
  
  insulin pump Misc Commonly known as:  PATIENT SUPPLIED  
as needed. Humalog insulin pump basal + bolus  
  
 aspirin 81 mg chewable tablet Take 81 mg by mouth daily. buPROPion 100 mg tablet Commonly known as:  WELLBUTRIN  
TAKE ONE TABLET BY MOUTH TWO TIMES A DAY  
  
 carvedilol 6.25 mg tablet Commonly known as:  Adria Haney Take 1 tablet by mouth every morning. Take 2 tablets by mouth every evening. chlorpheniramine-HYDROcodone 10-8 mg/5 mL suspension Commonly known as:  Van Garrett Take 5 mL by mouth every twelve (12) hours as needed for Cough. Max Daily Amount: 10 mL. glucose blood VI test strips strip Commonly known as:  Ascensia CONTOUR To test daily blood sugars. insulin lispro 100 unit/mL injection Commonly known as:  HumaLOG U-100 Insulin 1 Units by SubCUTAneous route continuous. To use for insulin pump. Lancets Misc Onetouch Ultrasoft lancets  
  
 levothyroxine 175 mcg tablet Commonly known as:  SYNTHROID  
TAKE 1 TABLET BY MOUTH EVERY DAY  
  
 MUCINEX 1,200 mg Ta12 ER tablet Generic drug:  guaiFENesin Take 1,200 mg by mouth two (2) times a day. nitroglycerin 0.4 mg SL tablet Commonly known as:  NITROSTAT  
1 Tab by SubLINGual route every five (5) minutes as needed for Chest Pain. Indications: ANGINA  
  
 omeprazole-sodium bicarbonate 40-1.1 mg-gram capsule Commonly known as:  ZEGERID  
  
 RANEXA 500 mg SR tablet Generic drug:  ranolazine ER Take 1 tablet by mouth twice daily. rosuvastatin 10 mg tablet Commonly known as:  CRESTOR Take 1 Tab by mouth nightly for 90 days. sacubitril-valsartan 24-26 mg tablet Commonly known as:  ENTRESTO Take 1 Tab by mouth two (2) times a day. VITAMIN D3 2,000 unit Tab Generic drug:  cholecalciferol (vitamin D3) Take  by mouth. Prescriptions Sent to Pharmacy Refills  
 sacubitril-valsartan (ENTRESTO) 24 mg/26 mg tablet 6 Sig: Take 1 Tab by mouth two (2) times a day. Class: Normal  
 Pharmacy: [facility]/pharmacy [address], [address] [place] [address] Ph #: [phone] Route: Oral  
  
We Performed the Following AMB POC EKG ROUTINE W/ 12 LEADS, INTER & REP [06906 CPT(R)] METABOLIC PANEL, BASIC [95825 CPT(R)] Follow-up Instructions Return in about 4 weeks (around 7/4/2018). Patient Instructions We are going to change your Lisinopril to a newer heart failure drug called ENtresto. The dose will be 24/26 mg twice daily. We need to stop the Lisinopril and hold this for 2 DAYS prior to starting the Tiffanie Hoose. We will then repeat your lab work in 2 weeks and then re-group by phone. Please continue to monitor your blood pressure at home. Continue to stay well hydrated. We will then see you back in 1 month to see if we can up-titrate your dose. Introducing [facility]! Dear Steven Files: Thank you for requesting a [employer] account. Our records indicate that you have previously registered for a [employer] account but its currently inactive. Please call our [employer] support line at [phone]. Additional Information If you have questions, please visit the Frequently Asked Questions section of the [employer] website at [email or web address] Remember, [employer] is NOT to be used for urgent needs. For medical emergencies, dial 911. Now available from your iPhone and Android! Please provide this summary of care documentation to your next provider. Your primary care clinician is listed as Melonie Santana. If you have any questions after today's visit, please call [phone].

## 2018-06-06 NOTE — PROGRESS NOTES
History of Present Illness  Sabrina Negron is a 61 y.o. female. Last seen 6 months ago. Problem List  Date Reviewed: 6/6/2018          Codes Class Noted    Dyslipidemia ICD-10-CM: E78.5  ICD-9-CM: 272.4  5/31/2016        Hypothyroidism ICD-10-CM: E03.9  ICD-9-CM: 244.9  2/23/2016        Ischemic cardiomyopathy ICD-10-CM: I25.5  ICD-9-CM: 414.8  11/2/2014        CAD (coronary artery disease), native coronary artery (Chronic) ICD-10-CM: I25.10  ICD-9-CM: 414.01  11/2/2014        PAD (peripheral artery disease) (HCC) (Chronic) ICD-10-CM: I73.9  ICD-9-CM: 443.9  10/6/2014        DM type 1 causing renal disease (HCC) (Chronic) ICD-10-CM: E10.29  ICD-9-CM: 250.41  10/6/2014        CKD (chronic kidney disease), stage III (Chronic) ICD-10-CM: N18.3  ICD-9-CM: 585.3  9/25/2014        Anemia, iron deficiency ICD-10-CM: D50.9  ICD-9-CM: 280.9  9/25/2014        COPD (chronic obstructive pulmonary disease) ([facility] ICD-10-CM: J44.9  ICD-9-CM: 496  12/17/2009            Cardiac Testing  ECHO: 9/22/2014: EF 40%, mild diffuse HK, mild MR, TR   Cath 10/14/2014 - EDP 3, no AV gradient, LVEF 40-45%, inferior AK, mod cor Ca2+, LM mild plaque, LAD mild prox plaque, apical 85%, D1 diffuse 75%, LCX mid 90% involving distal OM, RCA prox 100% with L to R collaterals  Carotid duplex 11/5/14 - 0-9% left, 10-49% right  Angio 11/19/14 - patent stents on right with 3 vessel runoff, left mid SFA occlusion with 1 vessel runoff   11/23/15 Echo - akinesis of inferior and inferolateral walls, EF 30-35%  11/23/15 Carotid duplex - 0-9% right internal carotid, 10-49% left internal carotid. No significant change since 11/2014. Echo 2/19/16 - EF 40%. with mild lateral hypokinesis and severe posterior/inferior hypokinesis. Grade 1 diastolic dysfunction. Mild LAE. LHC 2/22/16 - Proximal LAD 40%, Mid LAD 50%, Distal LAD 90%. Echo 3/28/17 - EF 40 %. Akinesis of the basal-mid inferoseptal, basal-mid inferior, and basal-mid inferolateral wall(s). Mild MR. No significant change when compared to study 20-Feb-2016. Echo 6/6/18 - EF 35-40%. Severe HK of the basal-mid inferoseptal, basal-mid inferior, and basal-mid inferolateral wall(s). Mild MR. AoV sclerosis without stenosis. No change c/t study 28-Mar-2017. HPI  Ms. Fadia Schultz denies any significant interval issues. She remains active walking - she has 4 dogs. She avoid anything \"too rigorous\" due to chronic fatigue. She denies any exertional chest pain or dyspnea. Stamina is unchanged. She denies any palpitations, syncope, orthopnea, edema or paroxysmal nocturnal dyspnea. She denies any progression in mild claudication symptoms. She continues to remain smoke free. Home monitoring of BP - 's. She continues to wear an insulin pump. DM and Lipids followed by Dr. Tonia Ngo. Current Outpatient Prescriptions   Medication Sig    lisinopril (PRINIVIL, ZESTRIL) 5 mg tablet Take 1 Tab by mouth daily for 90 days.  carvedilol (COREG) 6.25 mg tablet Take 1 tablet by mouth every morning. Take 2 tablets by mouth every evening.  rosuvastatin (CRESTOR) 10 mg tablet Take 1 Tab by mouth nightly for 90 days.  lisinopril (PRINIVIL, ZESTRIL) 5 mg tablet     levothyroxine (SYNTHROID) 175 mcg tablet TAKE 1 TABLET BY MOUTH EVERY DAY    Lancets misc Onetouch Ultrasoft lancets    cholecalciferol, vitamin D3, (VITAMIN D3) 2,000 unit tab Take  by mouth.  insulin lispro (HUMALOG) 100 unit/mL injection 1 Units by SubCUTAneous route continuous. To use for insulin pump.  glucose blood VI test strips (ASCENSIA CONTOUR) strip To test daily blood sugars.  nitroglycerin (NITROSTAT) 0.4 mg SL tablet 1 Tab by SubLINGual route every five (5) minutes as needed for Chest Pain. Indications: ANGINA    aspirin 81 mg chewable tablet Take 81 mg by mouth daily.   insulin pump (PATIENT SUPPLIED) misc as needed.  Humalog insulin pump basal + bolus    RANEXA 500 mg SR tablet Take 1 tablet by mouth twice daily.  buPROPion (WELLBUTRIN) 100 mg tablet TAKE ONE TABLET BY MOUTH TWO TIMES A DAY    omeprazole-sodium bicarbonate (ZEGERID) 40-1.1 mg-gram capsule     guaiFENesin (MUCINEX) 1,200 mg Ta12 ER tablet Take 1,200 mg by mouth two (2) times a day.  chlorpheniramine-HYDROcodone (TUSSIONEX) 10-8 mg/5 mL suspension Take 5 mL by mouth every twelve (12) hours as needed for Cough. Max Daily Amount: 10 mL. No current facility-administered medications for this visit. Social History     Social History    Marital status:      Spouse name: N/A    Number of children: N/A    Years of education: N/A     Occupational History    Not on file. Social History Main Topics    Smoking status: Former Smoker     Packs/day: 0.50     Years: 35.00     Types: Cigarettes     Quit date: 9/21/2014    Smokeless tobacco: Never Used    Alcohol use No    Drug use: No    Sexual activity: No     Other Topics Concern    Not on file     Social History Narrative     Review of Systems  Constitutional: Negative for fever, chills, and diaphoresis. Positive for fatigue; unchanged. Respiratory: Negative for cough, hemoptysis, sputum production, shortness of breath and wheezing. Cardiovascular: Negative for chest pain, palpitations, orthopnea, leg swelling and PND. Positive for mild claudication sxs; unchanged. Gastrointestinal: Negative for heartburn, blood in stool and melena. Genitourinary: Negative for dysuria and flank pain. Musculoskeletal: Negative for joint pain and back pain. Skin: Negative for rash. Neurological: Negative for focal weakness, seizures, loss of consciousness, weakness and headaches. Endo/Heme/Allergies: Does not bruise/bleed easily. Psychiatric/Behavioral: Negative for memory loss.       Visit Vitals    /84    Pulse 85    Ht 5' 6\" (1.676 m)    Wt 150 lb (68 kg)    SpO2 96%    BMI 24.21 kg/m2     Wt Readings from Last 3 Encounters:   06/06/18 150 lb (68 kg)   10/24/17 155 lb 12.8 oz (70.7 kg)   08/21/17 149 lb 9.6 oz (67.9 kg)     Lab Results   Component Value Date/Time    Cholesterol, total 272 (H) 10/24/2017 02:01 PM    HDL Cholesterol 99 10/24/2017 02:01 PM    LDL, calculated 155 (H) 10/24/2017 02:01 PM    VLDL, calculated 18 10/24/2017 02:01 PM    Triglyceride 91 10/24/2017 02:01 PM    CHOL/HDL Ratio 2.4 02/20/2016 08:25 AM     Physical Exam  General - well developed well nourished  Neck - JVP normal, thyroid normal  Cardiac - normal S1,S2, no murmurs, rubs or gallops. No clicks  Vascular - carotids without bruits, radials, femorals and pedal pulses equal bilateral  Lungs - clear to auscultation bilaterals, no rales, wheezing or rhonchi  Abd - soft nontender  Extremities - no edema, warm. Skin - no rash  Neuro - nonfocal  Psych - normal mood and affect    Cardiographics  EKG 10/6/14 - Sinus at 90 bpm, nonspecific ST changes  EKG 10/30/14 - sinus, normal EKG  EKG 2/15/17 -   EKG 6/6/18 - SR 83    Lab Results   Component Value Date/Time    Sodium 141 10/24/2017 02:01 PM    Potassium 4.7 10/24/2017 02:01 PM    Chloride 100 10/24/2017 02:01 PM    CO2 24 10/24/2017 02:01 PM    Anion gap 10 02/17/2017 02:10 AM    Glucose 112 (H) 10/24/2017 02:01 PM    BUN 35 (H) 10/24/2017 02:01 PM    Creatinine 1.46 (H) 10/24/2017 02:01 PM    BUN/Creatinine ratio 24 (H) 10/24/2017 02:01 PM    GFR est AA 45 (L) 10/24/2017 02:01 PM    GFR est non-AA 39 (L) 10/24/2017 02:01 PM    Calcium 10.0 10/24/2017 02:01 PM    Bilirubin, total 0.4 10/24/2017 02:01 PM    AST (SGOT) 27 10/24/2017 02:01 PM    Alk. phosphatase 94 10/24/2017 02:01 PM    Protein, total 7.5 10/24/2017 02:01 PM    Albumin 4.6 10/24/2017 02:01 PM    Globulin 3.9 02/16/2017 10:14 PM    A-G Ratio 1.6 10/24/2017 02:01 PM    ALT (SGPT) 20 10/24/2017 02:01 PM     ASSESSMENT and PLAN  Encounter Diagnoses   Name Primary?     Coronary artery disease involving native coronary artery, angina presence unspecified, unspecified whether native or transplanted heart Yes    PAD (peripheral artery disease) ([facility]     Ischemic cardiomyopathy     Type 1 diabetes mellitus with stage 3 chronic kidney disease (HCC)     CKD (chronic kidney disease), stage III     Dyslipidemia     Iron deficiency anemia, unspecified iron deficiency anemia type     Chronic obstructive pulmonary disease with acute exacerbation (HCC)     Hypothyroidism due to acquired atrophy of thyroid      Ms. Briseyda Daniels has diffuse atherosclerosis involving her coronary, carotid, and lower extremity circulation in the setting of T1DM. She has multivessel CAD with NSTEMI 2/19/16. She is felt to be best suited for continued medical therapy. She has derived benefit from Ranexa 1000 mg BID. No anginal symptoms at her current functional capacity. Continue current regimen. Persistent LV dysfunction by repeat Echo today - 35-40%. No HF sxs on no diuretic regimen. Continue Coreg. Further optimize her HF regimen with the addition of Entresto 24/26 mg BID in place of Lisinopril following a 36 hour wash out period. Reviewed medication instructions and side effects to monitor for. Encouraged her to ensure proper hydration, monitor daily BP and weight and to call with any concerns. Will repeat BMP and pro BNP again in 2 weeks. Return to office again in 1 month. If tolerating, will then consider further dose adjustment. Plan to repeat Echo again in 1 year. Chronic left leg claudication in the setting of diffuse PAD involving the left iliac circulation. Continue medical therapy. I encouraged her to stay smoke free, continue ASA and to exercise regularly. The patient was encouraged to continue current medications, remain active and call with any new complaints or concerns. Follow-up Disposition:  Return in about 4 weeks (around 7/4/2018).      TRISHA Huerta MD

## 2018-06-28 RX ORDER — BUPROPION HYDROCHLORIDE 100 MG/1
TABLET ORAL
Qty: 90 TAB | Refills: 0 | OUTPATIENT
Start: 2018-06-28

## 2018-07-22 ENCOUNTER — HOSPITAL ENCOUNTER (EMERGENCY)
Age: 58
Discharge: HOME | End: 2018-07-22
Attending: EMERGENCY MEDICINE | Admitting: EMERGENCY MEDICINE
Payer: COMMERCIAL

## 2018-07-22 ENCOUNTER — APPOINTMENT (OUTPATIENT)
Dept: GENERAL RADIOLOGY | Age: 58
End: 2018-07-22
Attending: EMERGENCY MEDICINE
Payer: COMMERCIAL

## 2018-07-22 VITALS
WEIGHT: 156 LBS | TEMPERATURE: 98.4 F | HEIGHT: 66 IN | HEART RATE: 91 BPM | BODY MASS INDEX: 25.07 KG/M2 | SYSTOLIC BLOOD PRESSURE: 183 MMHG | OXYGEN SATURATION: 96 % | RESPIRATION RATE: 18 BRPM | DIASTOLIC BLOOD PRESSURE: 86 MMHG

## 2018-07-22 DIAGNOSIS — M25.552 LEFT HIP PAIN: Primary | ICD-10-CM

## 2018-07-22 PROCEDURE — 99283 EMERGENCY DEPT VISIT LOW MDM: CPT

## 2018-07-22 PROCEDURE — 73502 X-RAY EXAM HIP UNI 2-3 VIEWS: CPT

## 2018-07-22 PROCEDURE — 74011250637 HC RX REV CODE- 250/637: Performed by: EMERGENCY MEDICINE

## 2018-07-22 RX ORDER — HYDROCODONE BITARTRATE AND ACETAMINOPHEN 5; 325 MG/1; MG/1
1 TABLET ORAL
Qty: 8 TAB | Refills: 0 | Status: SHIPPED | OUTPATIENT
Start: 2018-07-22 | End: 2018-09-18

## 2018-07-22 RX ORDER — HYDROCODONE BITARTRATE AND ACETAMINOPHEN 5; 325 MG/1; MG/1
1 TABLET ORAL
Status: COMPLETED | OUTPATIENT
Start: 2018-07-22 | End: 2018-07-22

## 2018-07-22 RX ADMIN — HYDROCODONE BITARTRATE AND ACETAMINOPHEN 1 TABLET: 5; 325 TABLET ORAL at 05:54

## 2018-07-22 NOTE — ED NOTES
Pt resting on the stretcher in no distress. All results available for review and awaiting further care mgmt. Dr Ocasio Hy in to speak with pt regarding test results. Will continue to monitor.

## 2018-07-22 NOTE — ED NOTES
Pt offered crutches but rpts she has some at home. Pt offered a CT scan to further evaluate her pain since she has not had any relief; pt declined. Dr Harley Zhu advised pt to not bear weight if pain continues and f/u with Ortho.

## 2018-07-22 NOTE — DISCHARGE INSTRUCTIONS
Hip Pain: Care Instructions  Your Care Instructions    Hip pain may be caused by many things, including overuse, a fall, or a twisting movement. Another cause of hip pain is arthritis. Your pain may increase when you stand up, walk, or squat. The pain may come and go or may be constant. Home treatment can help relieve hip pain, swelling, and stiffness. If your pain is ongoing, you may need more tests and treatment. Follow-up care is a key part of your treatment and safety. Be sure to make and go to all appointments, and call your doctor if you are having problems. It's also a good idea to know your test results and keep a list of the medicines you take. How can you care for yourself at home? · Take pain medicines exactly as directed. ¨ If the doctor gave you a prescription medicine for pain, take it as prescribed. ¨ If you are not taking a prescription pain medicine, ask your doctor if you can take an over-the-counter medicine. · Rest and protect your hip. Take a break from any activity, including standing or walking, that may cause pain. · Put ice or a cold pack against your hip for 10 to 20 minutes at a time. Try to do this every 1 to 2 hours for the next 3 days (when you are awake) or until the swelling goes down. Put a thin cloth between the ice and your skin. · Sleep on your healthy side with a pillow between your knees, or sleep on your back with pillows under your knees. · If there is no swelling, you can put moist heat, a heating pad, or a warm cloth on your hip. Do gentle stretching exercises to help keep your hip flexible. · Learn how to prevent falls. Have your vision and hearing checked regularly. Wear slippers or shoes with a nonskid sole. · Stay at a healthy weight. · Wear comfortable shoes. When should you call for help? Call 911 anytime you think you may need emergency care.  For example, call if:    · You have sudden chest pain and shortness of breath, or you cough up blood.     · You are not able to stand or walk or bear weight.     · Your buttocks, legs, or feet feel numb or tingly.     · Your leg or foot is cool or pale or changes color.     · You have severe pain.    Call your doctor now or seek immediate medical care if:    · You have signs of infection, such as:  ¨ Increased pain, swelling, warmth, or redness in the hip area. ¨ Red streaks leading from the hip area. ¨ Pus draining from the hip area. ¨ A fever.     · You have signs of a blood clot, such as:  ¨ Pain in your calf, back of the knee, thigh, or groin. ¨ Redness and swelling in your leg or groin.     · You are not able to bend, straighten, or move your leg normally.     · You have trouble urinating or having bowel movements.    Watch closely for changes in your health, and be sure to contact your doctor if:    · You do not get better as expected. Where can you learn more? Go to http://luis enrique-melinda.info/. Enter E226 in the search box to learn more about \"Hip Pain: Care Instructions. \"  Current as of: November 20, 2017  Content Version: 11.7  © 2733-9803 [employer]. Care instructions adapted under license by [employer] (which disclaims liability or warranty for this information). If you have questions about a medical condition or this instruction, always ask your healthcare professional. [place] [address] any warranty or liability for your use of this information. We hope that we have addressed all of your medical concerns. The examination and treatment you received in the Emergency Department were for an emergent problem and were not intended as complete care. It is important that you follow up with your healthcare provider(s) for ongoing care. If your symptoms worsen or do not improve as expected, and you are unable to reach your usual health care provider(s), you should return to the Emergency Department.       Today's healthcare is undergoing tremendous change, and patient satisfaction surveys are one of the many tools to assess the quality of medical care. You may receive a survey from the [employer] regarding your experience in the Emergency Department. I hope that your experience has been completely positive, particularly the medical care that I provided. As such, please participate in the survey; anything less than excellent does not meet my expectations or intentions. [facility] and [facility] participate in nationally recognized quality of care measures. If your blood pressure is greater than 120/80, as reported below, we urge that you seek medical care to address the potential of high blood pressure, commonly known as hypertension. Hypertension can be hereditary or can be caused by certain medical conditions, pain, stress, or \"white coat syndrome. \"       Please make an appointment with your health care provider(s) for follow up of your Emergency Department visit. VITALS:   Patient Vitals for the past 8 hrs:   Temp Pulse Resp BP SpO2   07/22/18 0540 98.4 °F (36.9 °C) 91 18 183/86 96 %          Thank you for allowing us to provide you with medical care today. We realize that you have many choices for your emergency care needs. Please choose us in the future for any continued health care needs. Tara James, [address]: [phone]            No results found for this or any previous visit (from the past 24 hour(s)). Xr Hip Lt W Or Wo Pelv 2-3 Vws    Result Date: 7/22/2018  EXAM:  XR HIP LT W OR WO PELV 2-3 VWS INDICATION:   Left hip pain this morning after stepping out of a vehicle. COMPARISON: None. FINDINGS: An AP view of the pelvis and a frogleg lateral view of the left hip demonstrate no fracture, dislocation or other acute abnormality. Mild bilateral hip osteoarthritis. Bones are osteopenic. Sacroiliac joint osteoarthritis is age-appropriate. Right iliac vascular stent is visible. Battery pack is partially imaged in the left flank. IMPRESSION:  Osteopenia without evidence of fracture.

## 2018-07-22 NOTE — ED NOTES
The patient was discharged home by Dr Fredy Barnes  in stable condition. The patient is alert and oriented, in no respiratory distress. The patient's diagnosis, condition and treatment were explained. The patient expressed understanding. One prescriptions given. No work/school note given. A discharge plan has been developed. A  was not involved in the process. Aftercare instructions were given. Pt ambulatory out of the ED with family.

## 2018-07-22 NOTE — ED PROVIDER NOTES
Patient is a 60 y.o. female presenting with hip pain and leg pain. Hip Injury    Pertinent negatives include no neck pain. Leg Pain    Pertinent negatives include no neck pain. 61 yo WF presents with left hip pain onset 5:30pm last night. Pt was getting out of a car when she turned and felt a pop in her left hip. Pain 9/10, sharp, stabbing, worse with movement and bearing weight. Denies weakness/numbness, fever, chills. No hx of hip pain. Past Medical History:   Diagnosis Date    (HFpEF) heart failure with preserved ejection fraction ([address] 10/6/2014    CAD (coronary artery disease), native coronary artery 2014    Carotid artery disease ([address]     Carotid artery occlusion and stenosis 10/6/2014    CKD (chronic kidney disease), stage III 2014    COPD (chronic obstructive pulmonary disease) ([address] 2009    DKA (diabetic ketoacidoses) ([facility] 2014    DM (diabetes mellitus), type 1 ([address]     DM type 1 causing renal disease ([facility] 10/6/2014    Dyslipidemia 2009    Hypertension     Hypothyroid     Hypothyroidism 2016    Ischemic cardiomyopathy 2014    Nicotine dependence     NSTEMI (non-ST elevated myocardial infarction) ([address] 2014    PAD (peripheral artery disease) ([facility] 10/6/2014    Pneumonia        Past Surgical History:   Procedure Laterality Date    EGD  2014         HX CATARACT REMOVAL  2015    right eye    HX GI      gastric pacemaker    HX GYN       x2    HX ORTHOPAEDIC      bilateral knees arthroscopy    HX TMJ ARTHOTOMY      HX TONSIL AND ADENOIDECTOMY      HX TONSILLECTOMY           Family History:   Problem Relation Age of Onset    Heart Disease Mother     Heart Disease Father        Social History     Social History    Marital status:      Spouse name: N/A    Number of children: N/A    Years of education: N/A     Occupational History    Not on file.      Social History Main Topics    Smoking status: Former Smoker     Packs/day: 0.50     Years: 35.00     Types: Cigarettes     Quit date: 9/21/2014    Smokeless tobacco: Never Used    Alcohol use No    Drug use: No    Sexual activity: No     Other Topics Concern    Not on file     Social History Narrative         ALLERGIES: Compazine [prochlorperazine edisylate] and Percocet [oxycodone-acetaminophen]    Review of Systems   Constitutional: Negative for chills and fever. Respiratory: Negative for cough and shortness of breath. Gastrointestinal: Negative for nausea and vomiting. Musculoskeletal: Positive for arthralgias and myalgias. Negative for neck pain and neck stiffness. Skin: Negative for rash. All other systems reviewed and are negative.       Vitals:    07/22/18 0540   BP: 183/86   Pulse: 91   Resp: 18   Temp: 98.4 °F (36.9 °C)   SpO2: 96%   Weight: 70.8 kg (156 lb)   Height: 5' 6\" (1.676 m)            Physical Exam   Physical Examination: General appearance - alert, mild distress, oriented to person, place, and time and normal appearing weight  Eyes - pupils equal and reactive, extraocular eye movements intact  Neck - supple, no significant adenopathy  Chest - clear to auscultation, no wheezes, rales or rhonchi, symmetric air entry  Heart - normal rate, regular rhythm, normal S1, S2, no murmurs, rubs, clicks or gallops  Abdomen - soft, nontender, nondistended, no masses or organomegaly  Back exam - full range of motion, no tenderness, palpable spasm or pain on motion  Neurological - alert, oriented, normal speech, no focal findings or movement disorder noted  Musculoskeletal - no joint tenderness, deformity or swelling, tenderness to left lateral hip, limited rom due to pain, diminished pulses to b/l LE but still palpable  Extremities - peripheral pulses normal, no pedal edema, no clubbing or cyanosis  Skin - normal coloration and turgor, no rashes, no suspicious skin lesions noted  MDM  Number of Diagnoses or Management Options     Amount and/or Complexity of Data Reviewed  Tests in the radiology section of CPT®: ordered and reviewed  Decide to obtain previous medical records or to obtain history from someone other than the patient: yes  Review and summarize past medical records: yes  Independent visualization of images, tracings, or specimens: yes    Patient Progress  Patient progress: stable        ED Course       Procedures    No acute process on xray. Pt still with pain. Offered crutches and advised to be non weight bearing as long as it was painful. Pt refused crutches in ED stating she has them at home. Also offered to perform CT scan of pelvis to eval for occult fx but pt refused. I advised her to f/u with ortho if symptoms persisted or return to ED for worsening symptoms. VSS.

## 2018-07-22 NOTE — ED TRIAGE NOTES
Pt c/o left hip/leg pain after getting out of a vehicle at 0530 hours this am.  Denies falling.   Pt able to stand with assist.

## 2018-07-24 DIAGNOSIS — I25.5 ISCHEMIC CARDIOMYOPATHY: ICD-10-CM

## 2018-07-24 RX ORDER — LISINOPRIL 5 MG/1
TABLET ORAL
Qty: 30 TAB | Refills: 0 | Status: SHIPPED | OUTPATIENT
Start: 2018-07-24 | End: 2018-08-24 | Stop reason: SDUPTHER

## 2018-08-24 DIAGNOSIS — I25.5 ISCHEMIC CARDIOMYOPATHY: ICD-10-CM

## 2018-08-24 RX ORDER — LISINOPRIL 5 MG/1
TABLET ORAL
Qty: 90 TAB | Refills: 0 | Status: SHIPPED | OUTPATIENT
Start: 2018-08-24 | End: 2018-09-18

## 2018-09-18 ENCOUNTER — OFFICE VISIT (OUTPATIENT)
Dept: INTERNAL MEDICINE CLINIC | Age: 58
End: 2018-09-18

## 2018-09-18 VITALS
OXYGEN SATURATION: 98 % | HEIGHT: 66 IN | SYSTOLIC BLOOD PRESSURE: 166 MMHG | DIASTOLIC BLOOD PRESSURE: 73 MMHG | HEART RATE: 87 BPM | WEIGHT: 159 LBS | BODY MASS INDEX: 25.55 KG/M2 | RESPIRATION RATE: 16 BRPM | TEMPERATURE: 98 F

## 2018-09-18 DIAGNOSIS — E03.4 HYPOTHYROIDISM DUE TO ACQUIRED ATROPHY OF THYROID: ICD-10-CM

## 2018-09-18 DIAGNOSIS — S00.81XA CAT SCRATCH OF FACE, INITIAL ENCOUNTER: Primary | ICD-10-CM

## 2018-09-18 DIAGNOSIS — E10.22 TYPE 1 DIABETES MELLITUS WITH STAGE 3 CHRONIC KIDNEY DISEASE (HCC): Chronic | ICD-10-CM

## 2018-09-18 DIAGNOSIS — I25.5 ISCHEMIC CARDIOMYOPATHY: ICD-10-CM

## 2018-09-18 DIAGNOSIS — N18.30 TYPE 1 DIABETES MELLITUS WITH STAGE 3 CHRONIC KIDNEY DISEASE (HCC): Chronic | ICD-10-CM

## 2018-09-18 DIAGNOSIS — E03.9 ACQUIRED HYPOTHYROIDISM: ICD-10-CM

## 2018-09-18 DIAGNOSIS — Z23 ENCOUNTER FOR IMMUNIZATION: ICD-10-CM
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
## 2018-09-18 DIAGNOSIS — E78.5 DYSLIPIDEMIA: ICD-10-CM

## 2018-09-18 RX ORDER — LEVOTHYROXINE SODIUM 175 UG/1
TABLET ORAL
Qty: 90 TAB | Refills: 1 | Status: SHIPPED | OUTPATIENT
Start: 2018-09-18 | End: 2019-03-10 | Stop reason: SDUPTHER

## 2018-09-18 RX ORDER — ROSUVASTATIN CALCIUM 10 MG/1
TABLET, COATED ORAL
Qty: 90 TAB | Refills: 1 | Status: SHIPPED | OUTPATIENT
Start: 2018-09-18 | End: 2020-01-27 | Stop reason: SDUPTHER

## 2018-09-18 NOTE — PROGRESS NOTES
HISTORY OF PRESENT ILLNESS  Sheree Voss is a 60 y.o. female. HPI  Cat Scratch: Pt reports cat scratch near left eye yesterday. Diabetic Review of Systems - further diabetic ROS: no polyuria or polydipsia, no chest pain, dyspnea or TIA's, no numbness, tingling or pain in extremities. Other symptoms and concerns: Pt continues on Insulin. She notes 4-5 drops in sugar levels (20) in last 2 months. She notes that largest spike in sugar levels was 234. Pt monitors diet. hypothyroidism. Lab Results   Component Value Date/Time    TSH 6.300 (H) 01/25/2017 03:09 AM     Thyroid ROS: denies fatigue, weight changes, heat/cold intolerance, bowel/skin changes or CVS symptoms. She continues Synthroid. She ran out of Synthroid and wasn't able to take it for last 3 weeks. Dyslipidemia:  Cardiovascular risk analysis - 60 y.o. female LDL goal is under 100. ROS: taking medications as instructed, no medication side effects noted, no TIA's, no chest pain on exertion, no dyspnea on exertion, no swelling of ankles. Tolerating meds, no myalgias or other side effects noted  New concerns: Her last LDL was 155 on 10/24/17. Pt continues on Crestor. Ischemic Cardiomyopathy: Pt continues on Coreg and Entresto (2x/day). She will f/u with Dr. Ritesh Rodriguez on 10/17/18. Denies SOB or angina. Review of Systems   All other systems reviewed and are negative. Physical Exam   Constitutional: She is oriented to person, place, and time. She appears well-developed and well-nourished. HENT:   Head: Normocephalic and atraumatic. Right Ear: External ear normal.   Left Ear: External ear normal.   Nose: Nose normal.   Mouth/Throat: Oropharynx is clear and moist.   Eyes: Conjunctivae and EOM are normal.   Neck: Normal range of motion. Neck supple. Carotid bruit is not present. No thyroid mass and no thyromegaly present. Cardiovascular: Normal rate, regular rhythm, normal heart sounds and intact distal pulses. Pulmonary/Chest: Effort normal and breath sounds normal.   Abdominal: Soft. Bowel sounds are normal.   Musculoskeletal: Normal range of motion. Neurological: She is alert and oriented to person, place, and time. She has normal strength. No cranial nerve deficit or sensory deficit. Coordination normal.   Skin: Skin is warm and dry. No abrasion and no rash noted. Psychiatric: She has a normal mood and affect. Her behavior is normal. Judgment and thought content normal.   Nursing note and vitals reviewed. ASSESSMENT and PLAN  Diagnoses and all orders for this visit:    1. Cat scratch of face, initial encounter- no signs of acute infection   Stable condition. Administered tetanus vaccine today in office.   -     CBC W/O DIFF    2. Acquired hypothyroidism  Thyroid stable. I do not recommend a change in medications. -     levothyroxine (SYNTHROID) 175 mcg tablet; TAKE 1 TABLET BY MOUTH EVERY DAY    3. Type 1 diabetes mellitus with stage 3 chronic kidney disease (HCC)  Sugars stable. Continue to follow diabetic diet and monitor sugars. She drops into the 20s but highs are not too high- she needs follow up with endocrine!  -     METABOLIC PANEL, COMPREHENSIVE  -     HEMOGLOBIN A1C WITH EAG  -     MICROALBUMIN, UR, RAND    4. Hypothyroidism due to acquired atrophy of thyroid- she had not taken for awhile as ran out  Thyroid stable. I do not recommend a change in medications  -     TSH 3RD GENERATION  -     T4, FREE    5. Dyslipidemia  Stable, patient tolerating meds, no myalgias. I do not recommend any change in medications.  -     METABOLIC PANEL, COMPREHENSIVE  -     LIPID PANEL    6. Ischemic cardiomyopathy  Stable, and well-managed. No change in medications. Pt will f/u with Dr. Judah Quiroga on 10/17/18.       7. Encounter for immunization  Administered tetanus vaccine today in office.   -     Tetanus, diphtheria toxoids and acellular pertussis (TDAP) vaccine, in individuals >=7 years, IM    Other orders  - rosuvastatin (CRESTOR) 10 mg tablet; Take 1 Tab by mouth nightly for 90 days. -     sacubitril-valsartan (ENTRESTO) 24 mg/26 mg tablet; Take 1 Tab by mouth two (2) times a day. Lab results and schedule of future lab studies reviewed with patient. Reviewed diet, exercise and weight control. Written by Harvey Stephen, as dictated by Jorge Weinstein MD.     Current diagnosis and concerns discussed with pt at length. Understands risks and benefits or current treatment plan and medications and accepts the treatment and medication with any possible risks. Pt asks appropriate questions which were answered. Pt instructed to call with any concerns or problems.

## 2018-09-19 LAB
ALBUMIN SERPL-MCNC: 4.8 G/DL (ref 3.5–5.5)
ALBUMIN/GLOB SERPL: 1.8 {RATIO} (ref 1.2–2.2)
ALP SERPL-CCNC: 92 IU/L (ref 39–117)
ALT SERPL-CCNC: 28 IU/L (ref 0–32)
AST SERPL-CCNC: 38 IU/L (ref 0–40)
BILIRUB SERPL-MCNC: 0.3 MG/DL (ref 0–1.2)
BUN SERPL-MCNC: 45 MG/DL (ref 6–24)
BUN/CREAT SERPL: 26 (ref 9–23)
CALCIUM SERPL-MCNC: 11.2 MG/DL (ref 8.7–10.2)
CHLORIDE SERPL-SCNC: 101 MMOL/L (ref 96–106)
CHOLEST SERPL-MCNC: 609 MG/DL (ref 100–199)
CO2 SERPL-SCNC: 19 MMOL/L (ref 20–29)
COMMENT, 011824: ABNORMAL
CREAT SERPL-MCNC: 1.74 MG/DL (ref 0.57–1)
ERYTHROCYTE [DISTWIDTH] IN BLOOD BY AUTOMATED COUNT: 16.3 % (ref 12.3–15.4)
EST. AVERAGE GLUCOSE BLD GHB EST-MCNC: 212 MG/DL
GLOBULIN SER CALC-MCNC: 2.7 G/DL (ref 1.5–4.5)
GLUCOSE SERPL-MCNC: 123 MG/DL (ref 65–99)
HBA1C MFR BLD: 9 % (ref 4.8–5.6)
HCT VFR BLD AUTO: 45.1 % (ref 34–46.6)
HDLC SERPL-MCNC: 104 MG/DL
HGB BLD-MCNC: 14.9 G/DL (ref 11.1–15.9)
INTERPRETATION, 910389: NORMAL
INTERPRETATION: NORMAL
LDLC SERPL CALC-MCNC: 459 MG/DL (ref 0–99)
Lab: NORMAL
MCH RBC QN AUTO: 30.1 PG (ref 26.6–33)
MCHC RBC AUTO-ENTMCNC: 33 G/DL (ref 31.5–35.7)
MCV RBC AUTO: 91 FL (ref 79–97)
MICROALBUMIN UR-MCNC: 1239.2 UG/ML
PDF IMAGE, 910387: NORMAL
PLATELET # BLD AUTO: 272 X10E3/UL (ref 150–379)
POTASSIUM SERPL-SCNC: 4.6 MMOL/L (ref 3.5–5.2)
PROT SERPL-MCNC: 7.5 G/DL (ref 6–8.5)
RBC # BLD AUTO: 4.95 X10E6/UL (ref 3.77–5.28)
SODIUM SERPL-SCNC: 141 MMOL/L (ref 134–144)
T4 FREE SERPL-MCNC: <0.11 NG/DL (ref 0.82–1.77)
TRIGL SERPL-MCNC: 228 MG/DL (ref 0–149)
TSH SERPL DL<=0.005 MIU/L-ACNC: 149.6 UIU/ML (ref 0.45–4.5)
VLDLC SERPL CALC-MCNC: 46 MG/DL (ref 5–40)
WBC # BLD AUTO: 9.5 X10E3/UL (ref 3.4–10.8)

## 2018-10-17 ENCOUNTER — OFFICE VISIT (OUTPATIENT)
Dept: CARDIOLOGY CLINIC | Age: 58
End: 2018-10-17

## 2018-10-17 VITALS
HEART RATE: 84 BPM | BODY MASS INDEX: 25.23 KG/M2 | SYSTOLIC BLOOD PRESSURE: 140 MMHG | WEIGHT: 157 LBS | OXYGEN SATURATION: 98 % | RESPIRATION RATE: 24 BRPM | HEIGHT: 66 IN | DIASTOLIC BLOOD PRESSURE: 64 MMHG

## 2018-10-17 DIAGNOSIS — I73.9 PAD (PERIPHERAL ARTERY DISEASE) (HCC): ICD-10-CM

## 2018-10-17 DIAGNOSIS — E78.01 FAMILIAL HYPERCHOLESTEREMIA: ICD-10-CM

## 2018-10-17 DIAGNOSIS — N18.30 TYPE 1 DIABETES MELLITUS WITH STAGE 3 CHRONIC KIDNEY DISEASE (HCC): ICD-10-CM

## 2018-10-17 DIAGNOSIS — E78.5 DYSLIPIDEMIA: ICD-10-CM

## 2018-10-17 DIAGNOSIS — E10.22 TYPE 1 DIABETES MELLITUS WITH STAGE 3 CHRONIC KIDNEY DISEASE (HCC): ICD-10-CM

## 2018-10-17 DIAGNOSIS — I25.5 ISCHEMIC CARDIOMYOPATHY: Primary | ICD-10-CM

## 2018-10-17 DIAGNOSIS — I25.10 CORONARY ARTERY DISEASE INVOLVING NATIVE CORONARY ARTERY OF NATIVE HEART WITHOUT ANGINA PECTORIS: ICD-10-CM

## 2018-10-17 NOTE — PROGRESS NOTES
History of Present Illness  Gabi Kaur is a 60 y.o. female. Last seen 4 months ago. Problem List  Date Reviewed: 6/6/2018          Codes Class Noted    Dyslipidemia ICD-10-CM: E78.5  ICD-9-CM: 272.4  5/31/2016        Hypothyroidism ICD-10-CM: E03.9  ICD-9-CM: 244.9  2/23/2016        Ischemic cardiomyopathy ICD-10-CM: I25.5  ICD-9-CM: 414.8  11/2/2014        CAD (coronary artery disease), native coronary artery (Chronic) ICD-10-CM: I25.10  ICD-9-CM: 414.01  11/2/2014        PAD (peripheral artery disease) (HCC) (Chronic) ICD-10-CM: I73.9  ICD-9-CM: 443.9  10/6/2014        DM type 1 causing renal disease (HCC) (Chronic) ICD-10-CM: E10.29  ICD-9-CM: 250.41  10/6/2014        CKD (chronic kidney disease), stage III (HCC) (Chronic) ICD-10-CM: N18.3  ICD-9-CM: 585.3  9/25/2014        Anemia, iron deficiency ICD-10-CM: D50.9  ICD-9-CM: 280.9  9/25/2014        COPD (chronic obstructive pulmonary disease) ([facility] ICD-10-CM: J44.9  ICD-9-CM: 496  12/17/2009            Cardiac Testing  ECHO: 9/22/2014: EF 40%, mild diffuse HK, mild MR, TR   Cath 10/14/2014 - EDP 3, no AV gradient, LVEF 40-45%, inferior AK, mod cor Ca2+, LM mild plaque, LAD mild prox plaque, apical 85%, D1 diffuse 75%, LCX mid 90% involving distal OM, RCA prox 100% with L to R collaterals  Carotid duplex 11/5/14 - 0-9% left, 10-49% right  Angio 11/19/14 - patent stents on right with 3 vessel runoff, left mid SFA occlusion with 1 vessel runoff   11/23/15 Echo - akinesis of inferior and inferolateral walls, EF 30-35%  11/23/15 Carotid duplex - 0-9% right internal carotid, 10-49% left internal carotid. No significant change since 11/2014. Echo 2/19/16 - EF 40%. with mild lateral hypokinesis and severe posterior/inferior hypokinesis. Grade 1 diastolic dysfunction. Mild LAE. LHC 2/22/16 - Proximal LAD 40%, Mid LAD 50%, Distal LAD 90%. Echo 3/28/17 - EF 40 %.  Akinesis of the basal-mid inferoseptal, basal-mid inferior, and basal-mid inferolateral wall(s). Mild MR. No significant change when compared to study 20-Feb-2016. Echo 6/6/18 - EF 35-40%. Severe HK of the basal-mid inferoseptal, basal-mid inferior, and basal-mid inferolateral wall(s). Mild MR. AoV sclerosis without stenosis. No change c/t study 28-Mar-2017. HPI  Ms. Denise Martinez states she began smoking a few cigarettes/day due to stress. She states she has had both low and high blood sugars. She did not take Crestor for 6 weeks, as she ran out. She recently received a refill and re-started. She is adherent with Entresto, which she is tolerating well. Patient denies any exertional chest pain, dyspnea, palpitations, syncope, orthopnea, edema or paroxysmal nocturnal dyspnea. She has commercial insurance. Current Outpatient Medications   Medication Sig    rosuvastatin (CRESTOR) 10 mg tablet Take 1 Tab by mouth nightly for 90 days.  levothyroxine (SYNTHROID) 175 mcg tablet TAKE 1 TABLET BY MOUTH EVERY DAY    sacubitril-valsartan (ENTRESTO) 24 mg/26 mg tablet Take 1 Tab by mouth two (2) times a day.  carvedilol (COREG) 6.25 mg tablet Take 1 tablet by mouth every morning. Take 2 tablets by mouth every evening.  RANEXA 500 mg SR tablet Take 1 tablet by mouth twice daily.  cholecalciferol, vitamin D3, (VITAMIN D3) 2,000 unit tab Take  by mouth.  insulin lispro (HUMALOG) 100 unit/mL injection 1 Units by SubCUTAneous route continuous. To use for insulin pump.  glucose blood VI test strips (ASCENSIA CONTOUR) strip To test daily blood sugars.  nitroglycerin (NITROSTAT) 0.4 mg SL tablet 1 Tab by SubLINGual route every five (5) minutes as needed for Chest Pain. Indications: ANGINA    aspirin 81 mg chewable tablet Take 81 mg by mouth daily.   insulin pump (PATIENT SUPPLIED) misc as needed. Humalog insulin pump basal + bolus     No current facility-administered medications for this visit.       Social History     Socioeconomic History    Marital status:      Spouse name: Not on file    Number of children: Not on file    Years of education: Not on file    Highest education level: Not on file   Social Needs    Financial resource strain: Not on file    Food insecurity - worry: Not on file    Food insecurity - inability: Not on file    Transportation needs - medical: Not on file   [employer] needs - non-medical: Not on file   Occupational History    Not on file   Tobacco Use    Smoking status: Former Smoker     Packs/day: 0.25     Years: 35.00     Pack years: 8.75     Types: Cigarettes    Smokeless tobacco: Never Used   Substance and Sexual Activity    Alcohol use: No    Drug use: No    Sexual activity: No   Other Topics Concern    Not on file   Social History Narrative    Not on file     Review of Systems  Constitutional: Negative for fever, chills, and diaphoresis. Positive for fatigue; unchanged. Respiratory: Negative for cough, hemoptysis, sputum production, shortness of breath and wheezing. Cardiovascular: Negative for chest pain, palpitations, orthopnea, leg swelling and PND. Positive for mild claudication sxs; unchanged. Gastrointestinal: Negative for heartburn, blood in stool and melena. Genitourinary: Negative for dysuria and flank pain. Musculoskeletal: Negative for joint pain and back pain. Skin: Negative for rash. Neurological: Negative for focal weakness, seizures, loss of consciousness, weakness and headaches. Endo/Heme/Allergies: Does not bruise/bleed easily. Psychiatric/Behavioral: Negative for memory loss.       Visit Vitals  /64   Pulse 84   Resp 24   Ht 5' 6\" (1.676 m)   Wt 157 lb (71.2 kg)   SpO2 98%   BMI 25.34 kg/m²     Wt Readings from Last 3 Encounters:   10/17/18 157 lb (71.2 kg)   09/18/18 159 lb (72.1 kg)   07/22/18 156 lb (70.8 kg)     Lab Results   Component Value Date/Time    Cholesterol, total 609 (HH) 09/18/2018 03:08 PM    HDL Cholesterol 104 09/18/2018 03:08 PM    LDL, calculated 459 (H) 09/18/2018 03:08 PM VLDL, calculated 46 (H) 09/18/2018 03:08 PM    Triglyceride 228 (H) 09/18/2018 03:08 PM    CHOL/HDL Ratio 2.4 02/20/2016 08:25 AM     Physical Exam  General - well developed well nourished  Neck - JVP normal, thyroid normal  Cardiac - normal S1,S2, no murmurs, rubs or gallops. No clicks  Vascular - carotids without bruits, radials, femorals and pedal pulses equal bilateral  Lungs - clear to auscultation bilaterals, no rales, wheezing or rhonchi  Abd - soft nontender  Extremities - no edema, warm. Skin - no rash  Neuro - nonfocal  Psych - normal mood and affect    Cardiographics  EKG 10/6/14 - Sinus at 90 bpm, nonspecific ST changes  EKG 10/30/14 - sinus, normal EKG  EKG 2/15/17 -   EKG 6/6/18 - SR 83    Lab Results   Component Value Date/Time    Sodium 141 09/18/2018 03:08 PM    Potassium 4.6 09/18/2018 03:08 PM    Chloride 101 09/18/2018 03:08 PM    CO2 19 (L) 09/18/2018 03:08 PM    Anion gap 10 02/17/2017 02:10 AM    Glucose 123 (H) 09/18/2018 03:08 PM    BUN 45 (H) 09/18/2018 03:08 PM    Creatinine 1.74 (H) 09/18/2018 03:08 PM    BUN/Creatinine ratio 26 (H) 09/18/2018 03:08 PM    GFR est AA 36 (L) 09/18/2018 03:08 PM    GFR est non-AA 32 (L) 09/18/2018 03:08 PM    Calcium 11.2 (H) 09/18/2018 03:08 PM    Bilirubin, total 0.3 09/18/2018 03:08 PM    AST (SGOT) 38 09/18/2018 03:08 PM    Alk. phosphatase 92 09/18/2018 03:08 PM    Protein, total 7.5 09/18/2018 03:08 PM    Albumin 4.8 09/18/2018 03:08 PM    Globulin 3.9 02/16/2017 10:14 PM    A-G Ratio 1.8 09/18/2018 03:08 PM    ALT (SGPT) 28 09/18/2018 03:08 PM     ASSESSMENT and PLAN  Encounter Diagnoses   Name Primary?     Ischemic cardiomyopathy Yes    Coronary artery disease involving native coronary artery of native heart without angina pectoris     PAD (peripheral artery disease) (HCC)     Type 1 diabetes mellitus with stage 3 chronic kidney disease (HCC)     Dyslipidemia     Familial hypercholesteremia      Ms. Eunice Mejia has diffuse atherosclerosis involving her coronary, carotid, and lower extremity circulation in the setting of T1DM. She has multivessel CAD with NSTEMI 2/19/16. She is felt to be best suited for continued medical therapy. She has derived benefit from Ranexa 1000 mg BID. No anginal symptoms at her current functional capacity. Continue current regimen. Ischemic cardiomyopathy with moderate LV dysfunction, most recently by echo 6/2018 (EF 35-40%). She has class 2 HF on optimal medical therapy. Will increase Entresto to 49-51 mg BID. Chronic left leg claudication in the setting of diffuse PAD involving the left iliac circulation. Continue medical therapy. I encouraged her to stay smoke free, continue ASA and to exercise regularly. 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 Recent numbers from 9/2018 reflect absence of Crestor for 6 weeks prior, poorly controlled DM, and excess thyroid supplements. Her LDL was 459, , A1c 9.0%. She is back on Crestor 10 mg/d. On treatment, LDL-c 1 year ago was 155, Favor the addition of [id number] - Repatha 420/month injection. Repeat NMR after her third dose. Nicotine dependence. She is back to smoking, largely due to stress. She will work harder to quit. T1DM and thyroid disease managed by Dr. Sandip Mcintosh and David Gold. Follow-up Disposition: Not on File     Written by Roberto Harrison, as dictated by Dr. Christoph Ramirez.      Christoph Ramirez MD

## 2018-10-17 NOTE — PROGRESS NOTES
Visit Vitals  /64   Pulse 84   Resp 24   Ht 5' 6\" (1.676 m)   Wt 157 lb (71.2 kg)   SpO2 98%   BMI 25.34 kg/m²     Denies CP,SOB,Edema or dizziness.   Needs refill on Entresto-[facility]

## 2018-10-22 RX ORDER — SACUBITRIL AND VALSARTAN 24; 26 MG/1; MG/1
TABLET, FILM COATED ORAL
Qty: 60 TAB | Refills: 0 | Status: SHIPPED | OUTPATIENT
Start: 2018-10-22 | End: 2018-12-31 | Stop reason: SDUPTHER

## 2018-10-24 ENCOUNTER — TELEPHONE (OUTPATIENT)
Dept: CARDIOLOGY CLINIC | Age: 58
End: 2018-10-24

## 2018-10-24 NOTE — TELEPHONE ENCOUNTER
----- Message from Daniele Rodriguez MD sent at 10/17/2018 10:50 AM EDT -----  Regarding: repatha  Needs REPATHA 420 monthly injection    DX FH, CAD, DM  On Crestor 10    Can you faciliate?

## 2018-10-26 ENCOUNTER — TELEPHONE (OUTPATIENT)
Dept: CARDIOLOGY CLINIC | Age: 58
End: 2018-10-26

## 2018-10-30 ENCOUNTER — TELEPHONE (OUTPATIENT)
Dept: CARDIOLOGY CLINIC | Age: 58
End: 2018-10-30

## 2018-11-20 DIAGNOSIS — I25.5 ISCHEMIC CARDIOMYOPATHY: ICD-10-CM

## 2018-11-20 RX ORDER — LISINOPRIL 5 MG/1
TABLET ORAL
Qty: 90 TAB | Refills: 0 | Status: SHIPPED | OUTPATIENT
Start: 2018-11-20 | End: 2018-12-31 | Stop reason: SDUPTHER

## 2018-12-12 ENCOUNTER — TELEPHONE (OUTPATIENT)
Dept: INTERNAL MEDICINE CLINIC | Age: 58
End: 2018-12-12

## 2018-12-13 ENCOUNTER — TELEPHONE (OUTPATIENT)
Dept: INTERNAL MEDICINE CLINIC | Age: 58
End: 2018-12-13

## 2018-12-13 NOTE — TELEPHONE ENCOUNTER
Contacted pt to inquire if what strength of medication and dosage she utilizes. Pt advised she did not know. Inquired if Endo would bridge her until appt, pt stated she was told no. Advised pt I could not provide a bridge without information on medication. Pt would attempt to return call by 4:00.

## 2018-12-13 NOTE — TELEPHONE ENCOUNTER
----- Message from Jacinda Canela sent at 12/13/2018  4:03 PM EST -----  Regarding: /Telephone  Pt returned the call to office. Pt advised of Rx \"Novolog 10  units per ML\" with directions of \"used as directed with pump\".     Best [phone]

## 2018-12-21 RX ORDER — CARVEDILOL 6.25 MG/1
TABLET ORAL
Qty: 270 TAB | Refills: 1 | Status: SHIPPED | OUTPATIENT
Start: 2018-12-21 | End: 2019-06-19 | Stop reason: SDUPTHER

## 2018-12-21 RX ORDER — RANOLAZINE 500 MG/1
TABLET, FILM COATED, EXTENDED RELEASE ORAL
Qty: 180 TAB | Refills: 1 | Status: SHIPPED | OUTPATIENT
Start: 2018-12-21 | End: 2019-06-19 | Stop reason: SDUPTHER

## 2018-12-31 ENCOUNTER — HOSPITAL ENCOUNTER (OUTPATIENT)
Dept: GENERAL RADIOLOGY | Age: 58
Discharge: HOME | End: 2018-12-31
Attending: INTERNAL MEDICINE
Payer: COMMERCIAL

## 2018-12-31 ENCOUNTER — OFFICE VISIT (OUTPATIENT)
Dept: INTERNAL MEDICINE CLINIC | Age: 58
End: 2018-12-31

## 2018-12-31 VITALS
WEIGHT: 150 LBS | BODY MASS INDEX: 24.11 KG/M2 | TEMPERATURE: 97.7 F | HEIGHT: 66 IN | HEART RATE: 101 BPM | OXYGEN SATURATION: 99 % | RESPIRATION RATE: 18 BRPM | SYSTOLIC BLOOD PRESSURE: 180 MMHG | DIASTOLIC BLOOD PRESSURE: 79 MMHG

## 2018-12-31 DIAGNOSIS — R07.81 RIB PAIN ON LEFT SIDE: ICD-10-CM

## 2018-12-31 DIAGNOSIS — R07.81 RIB PAIN ON LEFT SIDE: Primary | ICD-10-CM

## 2018-12-31 DIAGNOSIS — I25.5 ISCHEMIC CARDIOMYOPATHY: ICD-10-CM

## 2018-12-31 DIAGNOSIS — I10 HTN, GOAL BELOW 130/80: ICD-10-CM

## 2018-12-31 DIAGNOSIS — S80.212A KNEE ABRASION, LEFT, INITIAL ENCOUNTER: ICD-10-CM

## 2018-12-31 PROCEDURE — 71101 X-RAY EXAM UNILAT RIBS/CHEST: CPT

## 2018-12-31 RX ORDER — HYDROCODONE BITARTRATE AND ACETAMINOPHEN 5; 325 MG/1; MG/1
1 TABLET ORAL
Qty: 30 TAB | Refills: 0 | Status: SHIPPED | OUTPATIENT
Start: 2018-12-31 | End: 2020-08-24 | Stop reason: ALTCHOICE

## 2018-12-31 RX ORDER — LISINOPRIL 5 MG/1
10 TABLET ORAL DAILY
Qty: 90 TAB | Refills: 0
Start: 2018-12-31 | End: 2019-10-23

## 2018-12-31 NOTE — PROGRESS NOTES
HISTORY OF PRESENT ILLNESS Spiek Espinosa is a 60 y.o. female. HPI Comes in because of a fall three days ago, slid on leaves and has banged up her left ribcage. She is having trouble with pain, particularly at night, and trouble when she moves her left arm. Her breathing is at baseline. She does have ischemic cardiomyopathy, EF around 40%. She has not had fevers or chills. She skinned up her left knee, but believes it is healing okay. Her blood pressure has been running high despite Coreg, Lisinopril and Entresto. We discussed options of bumping Coreg or Lisinopril. She would like to bump Lisinopril to 10 mg and will follow up with cardiology. Review of Systems Constitutional: Negative for chills, fever and weight loss. Respiratory: Negative for cough, shortness of breath and wheezing. Cardiovascular: Negative for chest pain, palpitations, orthopnea, leg swelling and PND. Gastrointestinal: Negative for abdominal pain, heartburn and nausea. Musculoskeletal: Positive for falls and myalgias. Negative for joint pain. Neurological: Negative for dizziness, sensory change and headaches. Physical Exam  
Constitutional: She is oriented to person, place, and time. She appears well-developed and well-nourished. HENT:  
Head: Normocephalic and atraumatic. Neck: Normal range of motion. Neck supple. Carotid bruit is not present. No thyromegaly present. Cardiovascular: Normal rate, regular rhythm, S1 normal, S2 normal, normal heart sounds and intact distal pulses. No murmur heard. Pulmonary/Chest: Effort normal. No respiratory distress. She has no wheezes. She has no rales. Splinting and no deep breathes Musculoskeletal: She exhibits tenderness (left lower ribs tender to palpation). She exhibits no edema. Neurological: She is alert and oriented to person, place, and time. Skin:  
Abrasion of left knee no sign of infection Psychiatric: She has a normal mood and affect. Her behavior is normal.  
Nursing note and vitals reviewed. ASSESSMENT and PLAN Diagnoses and all orders for this visit: 1. Rib pain on left side -     XR RIBS LT UNI 2 V; Future 
-     HYDROcodone-acetaminophen (NORCO) 5-325 mg per tablet; Take 1 Tab by mouth every six (6) hours as needed for Pain. Max Daily Amount: 4 Tabs. 2. Knee abrasion, left, initial encounter Cont topical abx and keep clean 3. HTN, goal below 130/80-bp up inc to 2 every day and appt with cardiology in jan 
-     lisinopril (PRINIVIL, ZESTRIL) 5 mg tablet; Take 2 Tabs by mouth daily. 4. Ischemic cardiomyopathy 
-     lisinopril (PRINIVIL, ZESTRIL) 5 mg tablet; Take 2 Tabs by mouth daily.

## 2019-01-02 ENCOUNTER — TELEPHONE (OUTPATIENT)
Dept: INTERNAL MEDICINE CLINIC | Age: 59
End: 2019-01-02

## 2019-01-02 NOTE — TELEPHONE ENCOUNTER
----- Message from Joshua Campbell LPN sent at 4/9/6987  2:57 PM EST -----      ----- Message -----  From: Ashly Barber MD  Sent: 1/2/2019   7:16 AM  To: Joshua Campbell LPN    Notify no acute fractures seen on rib films suspect all soft tissue and bruising-treat as discussed in office

## 2019-01-02 NOTE — PROGRESS NOTES
Notify no acute fractures seen on rib films suspect all soft tissue and bruising-treat as discussed in office

## 2019-02-08 ENCOUNTER — TELEPHONE (OUTPATIENT)
Dept: INTERNAL MEDICINE CLINIC | Age: 59
End: 2019-02-08

## 2019-02-08 NOTE — TELEPHONE ENCOUNTER
Patient called to report that she has had a cold that will not go away. Would like to speak to nurse regarding.      [phone]

## 2019-02-11 ENCOUNTER — OFFICE VISIT (OUTPATIENT)
Dept: INTERNAL MEDICINE CLINIC | Age: 59
End: 2019-02-11

## 2019-02-11 VITALS
RESPIRATION RATE: 16 BRPM | HEIGHT: 66 IN | WEIGHT: 146 LBS | HEART RATE: 108 BPM | OXYGEN SATURATION: 96 % | SYSTOLIC BLOOD PRESSURE: 120 MMHG | TEMPERATURE: 97.6 F | BODY MASS INDEX: 23.46 KG/M2 | DIASTOLIC BLOOD PRESSURE: 70 MMHG

## 2019-02-11 DIAGNOSIS — R05.9 COUGH: ICD-10-CM

## 2019-02-11 DIAGNOSIS — J32.1 FRONTAL SINUSITIS, UNSPECIFIED CHRONICITY: Primary | ICD-10-CM

## 2019-02-11 RX ORDER — BENZONATATE 200 MG/1
200 CAPSULE ORAL
Qty: 21 CAP | Refills: 0 | Status: SHIPPED | OUTPATIENT
Start: 2019-02-11 | End: 2019-02-18

## 2019-02-11 RX ORDER — AMOXICILLIN AND CLAVULANATE POTASSIUM 875; 125 MG/1; MG/1
1 TABLET, FILM COATED ORAL EVERY 12 HOURS
Qty: 20 TAB | Refills: 0 | Status: SHIPPED | OUTPATIENT
Start: 2019-02-11 | End: 2019-02-21

## 2019-02-11 RX ORDER — FLUCONAZOLE 150 MG/1
150 TABLET ORAL DAILY
Qty: 1 TAB | Refills: 0 | Status: SHIPPED | OUTPATIENT
Start: 2019-02-11 | End: 2019-02-12

## 2019-02-11 NOTE — PROGRESS NOTES
No Khoury is a 60 y.o. female who presents today for Sore Throat; Nasal Congestion; Ear Fullness; Headache; and Sinus Pain [place] She has a history of  
Patient Active Problem List  
Diagnosis Code  COPD (chronic obstructive pulmonary disease) ([facility]) J44.9  CKD (chronic kidney disease), stage III ([facility]) N18.3  Anemia, iron deficiency D50.9  PAD (peripheral artery disease) ([facility]) I73.9  DM type 1 causing renal disease ([facility]) E10.29  
 Ischemic cardiomyopathy I25.5  CAD (coronary artery disease), native coronary artery I25.10  Hypothyroidism E03.9  Dyslipidemia E78.5  Familial hypercholesteremia E78.01 [place] Today patient is here for an acute visit. Following with Cards for HLD. Upper respiratory illness: 
No Khoury presents with complaints of congestion, sore throat, dry cough, headache and hoarseness for 6 weeks. no nausea and no vomiting . she has not had  fever and chills. Symptoms are moderate. Over-the-counter remedies including Mucinex for 3 days. Drinking plenty of fluids: yes Asthma?:  yes Still smoking, less than one pack per week. Contacts with similar infections: yes, both grandchildren with URI. ROS Review of Systems Constitutional: Negative for chills, fever, malaise/fatigue and weight loss. HENT: Positive for congestion and sore throat. Negative for ear discharge, ear pain, nosebleeds, sinus pain and tinnitus. Respiratory: Positive for cough. Negative for hemoptysis, sputum production, shortness of breath and stridor. Cardiovascular: Negative for chest pain, palpitations and leg swelling. Gastrointestinal: Negative for abdominal pain, nausea and vomiting. Genitourinary: Negative for dysuria, frequency and urgency. Musculoskeletal: Negative for back pain, myalgias and neck pain. Neurological: Positive for headaches. Negative for dizziness, tingling, tremors, sensory change and focal weakness. Endo/Heme/Allergies: Does not bruise/bleed easily. Psychiatric/Behavioral: Negative for depression. The patient is not nervous/anxious. Visit Vitals /70 (BP 1 Location: Left arm, BP Patient Position: Sitting) Pulse (!) 108 Temp 97.6 °F (36.4 °C) (Oral) Resp 16 Ht 5' 6\" (1.676 m) Wt 146 lb (66.2 kg) SpO2 96% BMI 23.57 kg/m² Physical Exam  
Constitutional: She is oriented to person, place, and time. She appears well-developed and well-nourished. HENT:  
Head: Normocephalic and atraumatic. Right Ear: External ear normal.  
Left Ear: External ear normal.  
Mouth/Throat: No oropharyngeal exudate. Fluid behind both TM, ? Pus in L ear. Cardiovascular: Normal rate and regular rhythm. No murmur heard. Pulmonary/Chest: Effort normal. No respiratory distress. Clear but distant no egophony. Neurological: She is alert and oriented to person, place, and time. Skin: Skin is warm and dry. Psychiatric: She has a normal mood and affect. Her behavior is normal.  
 
 
 
Current Outpatient Medications Medication Sig  
 NOVOLOG U-100 INSULIN ASPART 100 unit/mL injection USE 40 TO 60 UNITS AS DIRECTED VIA PUMP DAILY. MUST MAKE APPT  lisinopril (PRINIVIL, ZESTRIL) 5 mg tablet Take 2 Tabs by mouth daily.  RANEXA 500 mg SR tablet Take 1 tablet by mouth twice daily.  carvedilol (COREG) 6.25 mg tablet Take 1 tablet by mouth every morning. Take 2 tablets by mouth every evening.  evolocumab (REPATHA PUSHTRONEX) 420 mg/3.5 mL Injt 420 Cartridges by SubCUTAneous route every thirty (30) days.  sacubitril-valsartan (ENTRESTO) 49 mg/51 mg tablet Take 1 Tab by mouth two (2) times a day.  rosuvastatin (CRESTOR) 10 mg tablet Take 1 Tab by mouth nightly for 90 days.  levothyroxine (SYNTHROID) 175 mcg tablet TAKE 1 TABLET BY MOUTH EVERY DAY  cholecalciferol, vitamin D3, (VITAMIN D3) 2,000 unit tab Take  by mouth.  insulin lispro (HUMALOG) 100 unit/mL injection 1 Units by SubCUTAneous route continuous. To use for insulin pump.  glucose blood VI test strips (ASCENSIA CONTOUR) strip To test daily blood sugars.  nitroglycerin (NITROSTAT) 0.4 mg SL tablet 1 Tab by SubLINGual route every five (5) minutes as needed for Chest Pain. Indications: ANGINA  aspirin 81 mg chewable tablet Take 81 mg by mouth daily.   insulin pump (PATIENT SUPPLIED) misc as needed. Humalog insulin pump basal + bolus  HYDROcodone-acetaminophen (NORCO) 5-325 mg per tablet Take 1 Tab by mouth every six (6) hours as needed for Pain. Max Daily Amount: 4 Tabs. No current facility-administered medications for this visit. Past Medical History:  
Diagnosis Date  (HFpEF) heart failure with preserved ejection fraction ([address] 10/6/2014  CAD (coronary artery disease), native coronary artery 2014  Carotid artery disease ([address]  Carotid artery occlusion and stenosis 10/6/2014  CKD (chronic kidney disease), stage III ([address] 2014  COPD (chronic obstructive pulmonary disease) ([address] 2009  DKA (diabetic ketoacidoses) ([address] 2014  DM (diabetes mellitus), type 1 ([address]  DM type 1 causing renal disease ([address] 10/6/2014  Dyslipidemia 2009  Hypertension  Hypothyroid  Hypothyroidism 2016  Ischemic cardiomyopathy 2014  Nicotine dependence  NSTEMI (non-ST elevated myocardial infarction) ([address] 2014  PAD (peripheral artery disease) ([address] 10/6/2014  Pneumonia Past Surgical History:  
Procedure Laterality Date  EGD  2014  HX CATARACT REMOVAL  2015  
 right eye  HX GI    
 gastric pacemaker  HX GYN    
  x2  HX ORTHOPAEDIC    
 bilateral knees arthroscopy  HX TMJ ARTHOTOMY  HX TONSIL AND ADENOIDECTOMY  HX TONSILLECTOMY Social History Tobacco Use  Smoking status: Former Smoker   Packs/day: 0.25  
 Years: 35.00 Pack years: 8.75 Types: Cigarettes  Smokeless tobacco: Never Used Substance Use Topics  Alcohol use: No  
  
Family History Problem Relation Age of Onset  Heart Disease Mother  Heart Disease Father Allergies Allergen Reactions  Compazine [Prochlorperazine Edisylate] Rash  Percocet [Oxycodone-Acetaminophen] Nausea Only ++ Hydrocodone (Norco/vicodin), Morphine, Hydromorphone (Dialudid) are okay per patient ++ Assessment/Plan Diagnoses and all orders for this visit: 1. Frontal sinusitis, unspecified chronicity - Urged to quit smoking.  
-     amoxicillin-clavulanate (AUGMENTIN) 875-125 mg per tablet; Take 1 Tab by mouth every twelve (12) hours for 10 days. -     fluconazole (DIFLUCAN) 150 mg tablet; Take 1 Tab by mouth daily for 1 day. FDA advises cautious prescribing of oral fluconazole in pregnancy. -     guaiFENesin (MUCINEX) 1,200 mg Ta12 ER tablet; Take 1 Tab by mouth two (2) times a day for 3 days. -     benzonatate (TESSALON) 200 mg capsule; Take 1 Cap by mouth three (3) times daily as needed for Cough for up to 7 days. 2. Cough 
-     amoxicillin-clavulanate (AUGMENTIN) 875-125 mg per tablet; Take 1 Tab by mouth every twelve (12) hours for 10 days. -     fluconazole (DIFLUCAN) 150 mg tablet; Take 1 Tab by mouth daily for 1 day. FDA advises cautious prescribing of oral fluconazole in pregnancy. -     guaiFENesin (MUCINEX) 1,200 mg Ta12 ER tablet; Take 1 Tab by mouth two (2) times a day for 3 days. -     benzonatate (TESSALON) 200 mg capsule; Take 1 Cap by mouth three (3) times daily as needed for Cough for up to 7 days. Follow-up Disposition: Not on File Harmony Phelan MD 
2/11/2019

## 2019-02-11 NOTE — PATIENT INSTRUCTIONS
Sinusitis: Care Instructions Your Care Instructions Sinusitis is an infection of the lining of the sinus cavities in your head. Sinusitis often follows a cold. It causes pain and pressure in your head and face. In most cases, sinusitis gets better on its own in 1 to 2 weeks. But some mild symptoms may last for several weeks. Sometimes antibiotics are needed. Follow-up care is a key part of your treatment and safety. Be sure to make and go to all appointments, and call your doctor if you are having problems. It's also a good idea to know your test results and keep a list of the medicines you take. How can you care for yourself at home? · Take an over-the-counter pain medicine, such as acetaminophen (Tylenol), ibuprofen (Advil, Motrin), or naproxen (Aleve). Read and follow all instructions on the label. · If the doctor prescribed antibiotics, take them as directed. Do not stop taking them just because you feel better. You need to take the full course of antibiotics. · Be careful when taking over-the-counter cold or flu medicines and Tylenol at the same time. Many of these medicines have acetaminophen, which is Tylenol. Read the labels to make sure that you are not taking more than the recommended dose. Too much acetaminophen (Tylenol) can be harmful. · Breathe warm, moist air from a steamy shower, a hot bath, or a sink filled with hot water. Avoid cold, dry air. Using a humidifier in your home may help. Follow the directions for cleaning the machine. · Use saline (saltwater) nasal washes to help keep your nasal passages open and wash out mucus and bacteria. You can buy saline nose drops at a grocery store or drugstore. Or you can make your own at home by adding 1 teaspoon of salt and 1 teaspoon of baking soda to 2 cups of distilled water. If you make your own, fill a bulb syringe with the solution, insert the tip into your nostril, and squeeze gently. Theodor Eisenmenger your nose. · Put a hot, wet towel or a warm gel pack on your face 3 or 4 times a day for 5 to 10 minutes each time. · Try a decongestant nasal spray like oxymetazoline (Afrin). Do not use it for more than 3 days in a row. Using it for more than 3 days can make your congestion worse. When should you call for help? Call your doctor now or seek immediate medical care if: 
  · You have new or worse swelling or redness in your face or around your eyes.  
  · You have a new or higher fever.  
 Watch closely for changes in your health, and be sure to contact your doctor if: 
  · You have new or worse facial pain.  
  · The mucus from your nose becomes thicker (like pus) or has new blood in it.  
  · You are not getting better as expected. Where can you learn more? Go to http://luis enrique-melinda.info/. Enter Y865 in the search box to learn more about \"Sinusitis: Care Instructions. \" Current as of: March 27, 2018 Content Version: 11.9 © 2958-1938 [employer]. Care instructions adapted under license by [employer] (which disclaims liability or warranty for this information). If you have questions about a medical condition or this instruction, always ask your healthcare professional. [address] any warranty or liability for your use of this information.

## 2019-02-18 RX ORDER — EVOLOCUMAB 420 MG/3.5
KIT SUBCUTANEOUS
Qty: 4 CARTRIDGE | Refills: 2 | Status: SHIPPED | OUTPATIENT
Start: 2019-02-18 | End: 2019-05-24 | Stop reason: SDUPTHER

## 2019-02-19 DIAGNOSIS — I25.5 ISCHEMIC CARDIOMYOPATHY: ICD-10-CM

## 2019-02-19 RX ORDER — LISINOPRIL 5 MG/1
TABLET ORAL
Qty: 90 TAB | Refills: 0 | Status: SHIPPED | OUTPATIENT
Start: 2019-02-19 | End: 2019-04-30 | Stop reason: SDUPTHER

## 2019-03-10 DIAGNOSIS — E03.9 ACQUIRED HYPOTHYROIDISM: ICD-10-CM

## 2019-03-10 RX ORDER — LEVOTHYROXINE SODIUM 175 UG/1
TABLET ORAL
Qty: 90 TAB | Refills: 1 | Status: SHIPPED | OUTPATIENT
Start: 2019-03-10 | End: 2019-09-12 | Stop reason: SDUPTHER

## 2019-04-08 ENCOUNTER — OFFICE VISIT (OUTPATIENT)
Dept: INTERNAL MEDICINE CLINIC | Age: 59
End: 2019-04-08

## 2019-04-08 VITALS
TEMPERATURE: 98 F | DIASTOLIC BLOOD PRESSURE: 79 MMHG | SYSTOLIC BLOOD PRESSURE: 145 MMHG | WEIGHT: 149.4 LBS | OXYGEN SATURATION: 100 % | HEART RATE: 88 BPM | HEIGHT: 66 IN | RESPIRATION RATE: 12 BRPM | BODY MASS INDEX: 24.01 KG/M2

## 2019-04-08 DIAGNOSIS — R05.9 COUGH: ICD-10-CM

## 2019-04-08 DIAGNOSIS — J32.4 CHRONIC PANSINUSITIS: Primary | ICD-10-CM

## 2019-04-08 RX ORDER — DOXYCYCLINE 100 MG/1
100 CAPSULE ORAL 2 TIMES DAILY
Qty: 28 CAP | Refills: 0 | Status: SHIPPED | OUTPATIENT
Start: 2019-04-08 | End: 2019-07-25 | Stop reason: ALTCHOICE

## 2019-04-08 RX ORDER — HYDROCODONE POLISTIREX AND CHLORPHENIRAMINE POLISTIREX 10; 8 MG/5ML; MG/5ML
1 SUSPENSION, EXTENDED RELEASE ORAL
Qty: 70 ML | Refills: 0 | Status: SHIPPED | OUTPATIENT
Start: 2019-04-08 | End: 2019-04-15

## 2019-04-08 NOTE — PATIENT INSTRUCTIONS
Chronic Sinusitis: Care Instructions  Your Care Instructions    Sinusitis is an infection of the lining of the sinus cavities in your head. It causes pain and pressure in your head and face. Sinusitis can be short-term (acute) or long-term (chronic). Chronic sinusitis lasts 12 weeks or longer. It is often caused by a bacterial or fungal infection. Other things, such as allergies, may also be involved. Chronic sinusitis may be hard to treat. It can lead to permanent changes in the mucous membranes that line the sinuses. It may make future sinus infections more likely. The infection may take some time to treat. Antibiotics are usually used if the infection is caused by bacteria. You may also need to use a corticosteroid nasal spray. If the infection is not cured after you try two or more different antibiotics, you may want to talk with your doctor about surgery or allergy testing. If the sinusitis is caused by a fungal infection, you may need to take antifungals or other medicines. You may also need surgery. Follow-up care is a key part of your treatment and safety. Be sure to make and go to all appointments, and call your doctor if you are having problems. It's also a good idea to know your test results and keep a list of the medicines you take. How can you care for yourself at home? Medicines    · Be safe with medicines. Take your medicines exactly as prescribed. Call your doctor if you think you are having a problem with your medicine. You will get more details on the specific medicines your doctor prescribes.     · Take your antibiotics as directed. Do not stop taking them just because you feel better. You need to take the full course of antibiotics.     · Your doctor may recommend a corticosteroid nasal spray, wash, drops, or pills. Take this medicine exactly as prescribed.    At home    · Breathe warm, moist air. You can use a steamy shower, a hot bath, or a sink filled with hot water.  Avoid cold, dry air. Using a humidifier in your home may help. Follow the instructions for cleaning the machine.     · Use saline (saltwater) nasal washes every day. This helps keep your nasal passages open. It also can wash out mucus and bacteria. ? You can buy saline nose drops at a grocery store or drugstore. ? You can make your own at home. Add 1 teaspoon of salt and 1 teaspoon of baking soda to 2 cups of distilled water. If you make your own, fill a bulb syringe with the solution. Then insert the tip into your nostril and squeeze gently. Amina Males your nose.     · Put a warm, wet towel or a warm gel pack on your face 3 or 4 times a day. Leave it on 5 to 10 minutes each time.     · Do not smoke or breathe secondhand smoke. Smoking can make sinusitis worse. If you need help quitting, talk to your doctor about stop-smoking programs and medicines. These can increase your chances of quitting for good. When should you call for help? Call your doctor now or seek immediate medical care if:    · You have new or worse symptoms of infection, such as:  ? Increased pain, swelling, warmth, or redness. ? Red streaks leading from the area. ? Pus draining from the area. ? A fever.    Watch closely for changes in your health, and be sure to contact your doctor if:    · The mucus from your nose becomes thicker (like pus) or has new blood in it.     · You do not get better as expected. Where can you learn more? Go to http://luis enrique-melinda.info/. Enter I815 in the search box to learn more about \"Chronic Sinusitis: Care Instructions. \"  Current as of: March 27, 2018  Content Version: 11.9  © 5927-5783 [employer]. Care instructions adapted under license by [employer] (which disclaims liability or warranty for this information).  If you have questions about a medical condition or this instruction, always ask your healthcare professional. [employer] disclaims any warranty or liability for your use of this information. Cough: Care Instructions  Your Care Instructions    A cough is your body's response to something that bothers your throat or airways. Many things can cause a cough. You might cough because of a cold or the flu, bronchitis, or asthma. Smoking, postnasal drip, allergies, and stomach acid that backs up into your throat also can cause coughs. A cough is a symptom, not a disease. Most coughs stop when the cause, such as a cold, goes away. You can take a few steps at home to cough less and feel better. Follow-up care is a key part of your treatment and safety. Be sure to make and go to all appointments, and call your doctor if you are having problems. It's also a good idea to know your test results and keep a list of the medicines you take. How can you care for yourself at home? · Drink lots of water and other fluids. This helps thin the mucus and soothes a dry or sore throat. Honey or lemon juice in hot water or tea may ease a dry cough. · Take cough medicine as directed by your doctor. · Prop up your head on pillows to help you breathe and ease a dry cough. · Try cough drops to soothe a dry or sore throat. Cough drops don't stop a cough. Medicine-flavored cough drops are no better than candy-flavored drops or hard candy. · Do not smoke. Avoid secondhand smoke. If you need help quitting, talk to your doctor about stop-smoking programs and medicines. These can increase your chances of quitting for good. When should you call for help? Call 911 anytime you think you may need emergency care.  For example, call if:    · You have severe trouble breathing.    Call your doctor now or seek immediate medical care if:    · You cough up blood.     · You have new or worse trouble breathing.     · You have a new or higher fever.     · You have a new rash.    Watch closely for changes in your health, and be sure to contact your doctor if:    · You cough more deeply or more often, especially if you notice more mucus or a change in the color of your mucus.     · You have new symptoms, such as a sore throat, an earache, or sinus pain.     · You do not get better as expected. Where can you learn more? Go to http://luis enrique-melinda.info/. Enter D279 in the search box to learn more about \"Cough: Care Instructions. \"  Current as of: September 5, 2018  Content Version: 11.9  © 2398-7358 [employer]. Care instructions adapted under license by [employer] (which disclaims liability or warranty for this information). If you have questions about a medical condition or this instruction, always ask your healthcare professional. [address] any warranty or liability for your use of this information.

## 2019-04-08 NOTE — PROGRESS NOTES
HISTORY OF PRESENT ILLNESS  Deniz Mackay is a 60 y.o. female. HPI  Presents with complaints of persistent sinus pressure/pain for the past 4 months. Began as URI in 2018 that progressed to sinusitis and was seen in office on 19 and diagnosed with frontal sinusitis; was treated with Augmentin x 10 days, Mucinex OTC and Tessalon. Reports symptoms seemed to improve only slightly with course of Augmentin and then progressively worsened again. Having persistent pain over both frontal and maxillary sinuses. Denies fever, chills. Has had thick clear to yellow nasal drainage and significant amount of postnasal drainage. Cough has been disturbing her sleep at night; denies shortness of breath, wheezing. Has been intolerant of Levaquin in past with significant GI distress.     Patient Active Problem List   Diagnosis Code    COPD (chronic obstructive pulmonary disease) ([facility] J44.9    CKD (chronic kidney disease), stage III ([facility]) N18.3    Anemia, iron deficiency D50.9    PAD (peripheral artery disease) ([facility]) I73.9    DM type 1 causing renal disease ([facility] E10.29    Ischemic cardiomyopathy I25.5    CAD (coronary artery disease), native coronary artery I25.10    Hypothyroidism E03.9    Dyslipidemia E78.5    Familial hypercholesteremia E78.01     Past Surgical History:   Procedure Laterality Date    EGD  2014         HX CATARACT REMOVAL  2015    right eye    HX GI      gastric pacemaker    HX GYN       x2    HX ORTHOPAEDIC      bilateral knees arthroscopy    HX TMJ ARTHOTOMY      HX TONSIL AND ADENOIDECTOMY      HX TONSILLECTOMY       Social History     Socioeconomic History    Marital status:      Spouse name: Not on file    Number of children: Not on file    Years of education: Not on file    Highest education level: Not on file   Occupational History    Not on file   Social Needs    Financial resource strain: Not on file    Food insecurity:     Worry: Not on file     Inability: Not on file    Transportation needs:     Medical: Not on file     Non-medical: Not on file   Tobacco Use    Smoking status: Former Smoker     Packs/day: 0.25     Years: 35.00     Pack years: 8.75     Types: Cigarettes    Smokeless tobacco: Never Used   Substance and Sexual Activity    Alcohol use: No    Drug use: No    Sexual activity: Never   Lifestyle    Physical activity:     Days per week: Not on file     Minutes per session: Not on file    Stress: Not on file   Relationships    Social connections:     Talks on phone: Not on file     Gets together: Not on file     Attends Christianity service: Not on file     Active member of club or organization: Not on file     Attends meetings of clubs or organizations: Not on file     Relationship status: Not on file    Intimate partner violence:     Fear of current or ex partner: Not on file     Emotionally abused: Not on file     Physically abused: Not on file     Forced sexual activity: Not on file   Other Topics Concern    Not on file   Social History Narrative    Not on file     Family History   Problem Relation Age of Onset    Heart Disease Mother     Heart Disease Father      Current Outpatient Medications   Medication Sig    doxycycline (MONODOX) 100 mg capsule Take 1 Cap by mouth two (2) times a day.  chlorpheniramine-HYDROcodone (TUSSIONEX) 10-8 mg/5 mL suspension Take 5 mL by mouth every twelve (12) hours as needed for Cough for up to 7 days. Max Daily Amount: 10 mL.  guaiFENesin-dextromethorphan SR (MUCINEX DM) 600-30 mg per tablet Take 1 Tab by mouth every twelve (12) hours as needed for Cough.  levothyroxine (SYNTHROID) 175 mcg tablet TAKE 1 TABLET BY MOUTH EVERY DAY    lisinopril (PRINIVIL, ZESTRIL) 5 mg tablet Take 1 Tab by mouth daily for 90 days.     REPATHA PUSHTRONEX 420 mg/3.5 mL Injt INJECT CONTENTS OF 1 CARTRIDGE (420MG) SUBCUTANEOUSLY ONCE EVERY 30 DAYS    NOVOLOG U-100 INSULIN ASPART 100 unit/mL injection USE 40 TO 60 UNITS AS DIRECTED VIA PUMP DAILY. MUST MAKE APPT    lisinopril (PRINIVIL, ZESTRIL) 5 mg tablet Take 2 Tabs by mouth daily.  RANEXA 500 mg SR tablet Take 1 tablet by mouth twice daily.  carvedilol (COREG) 6.25 mg tablet Take 1 tablet by mouth every morning. Take 2 tablets by mouth every evening.  sacubitril-valsartan (ENTRESTO) 49 mg/51 mg tablet Take 1 Tab by mouth two (2) times a day.  rosuvastatin (CRESTOR) 10 mg tablet Take 1 Tab by mouth nightly for 90 days.  cholecalciferol, vitamin D3, (VITAMIN D3) 2,000 unit tab Take  by mouth.  insulin lispro (HUMALOG) 100 unit/mL injection 1 Units by SubCUTAneous route continuous. To use for insulin pump.  glucose blood VI test strips (ASCENSIA CONTOUR) strip To test daily blood sugars.  aspirin 81 mg chewable tablet Take 81 mg by mouth daily.   insulin pump (PATIENT SUPPLIED) misc as needed. Humalog insulin pump basal + bolus    HYDROcodone-acetaminophen (NORCO) 5-325 mg per tablet Take 1 Tab by mouth every six (6) hours as needed for Pain. Max Daily Amount: 4 Tabs.  nitroglycerin (NITROSTAT) 0.4 mg SL tablet 1 Tab by SubLINGual route every five (5) minutes as needed for Chest Pain. Indications: ANGINA     No current facility-administered medications for this visit. Allergies   Allergen Reactions    Compazine [Prochlorperazine Edisylate] Rash    Percocet [Oxycodone-Acetaminophen] Nausea Only     ++ Hydrocodone (Norco/vicodin), Morphine, Hydromorphone (Dialudid) are okay per patient ++     Immunization History   Administered Date(s) Administered    Influenza Vaccine 10/15/2014, 10/01/2015, 11/10/2016, 10/21/2017, 11/06/2018    Pneumococcal Polysaccharide (PPSV-23) 03/14/2012    Tdap 07/30/2013, 09/18/2018       Review of Systems   Constitutional: Positive for malaise/fatigue. Negative for chills and fever. HENT: Positive for congestion, sinus pain and sore throat.     Respiratory: Positive for cough and sputum production. Negative for shortness of breath and wheezing. Cardiovascular: Negative for chest pain and palpitations. Gastrointestinal: Negative for nausea and vomiting. Musculoskeletal: Negative for myalgias. Skin: Negative for rash. Neurological: Positive for headaches. Negative for dizziness. /79 (BP 1 Location: Left arm, BP Patient Position: Sitting)   Pulse 88   Temp 98 °F (36.7 °C) (Oral)   Resp 12   Ht 5' 6\" (1.676 m)   Wt 149 lb 6.4 oz (67.8 kg)   SpO2 100%   BMI 24.11 kg/m²   Physical Exam   Constitutional: She is oriented to person, place, and time. She appears well-developed and well-nourished. HENT:   Head: Normocephalic and atraumatic. Right Ear: External ear normal.   Left Ear: External ear normal.   Nose: Mucosal edema present. Right sinus exhibits maxillary sinus tenderness and frontal sinus tenderness. Left sinus exhibits maxillary sinus tenderness and frontal sinus tenderness. Mouth/Throat: Posterior oropharyngeal erythema present. Neck: Normal range of motion. Neck supple. No thyromegaly present. Cardiovascular: Normal rate and regular rhythm. Pulmonary/Chest: Effort normal and breath sounds normal. She has no wheezes. She has no rales. Barking cough   Lymphadenopathy:     She has no cervical adenopathy. Neurological: She is alert and oriented to person, place, and time. Psychiatric: She has a normal mood and affect. Her behavior is normal.   Nursing note and vitals reviewed. ASSESSMENT and PLAN  Diagnoses and all orders for this visit:    1. Chronic pansinusitis --we will treat with 14-day course of doxycycline; if symptoms fail to improve, may need ENT evaluation.  -     doxycycline (MONODOX) 100 mg capsule; Take 1 Cap by mouth two (2) times a day. -     guaiFENesin-dextromethorphan SR (MUCINEX DM) 600-30 mg per tablet; Take 1 Tab by mouth every twelve (12) hours as needed for Cough.     2. Cough  -     chlorpheniramine-HYDROcodone (TUSSIONEX) 10-8 mg/5 mL suspension; Take 5 mL by mouth every twelve (12) hours as needed for Cough for up to 7 days. Max Daily Amount: 10 mL.       reviewed diet, exercise and weight control  reviewed medications and side effects in detail

## 2019-04-30 ENCOUNTER — OFFICE VISIT (OUTPATIENT)
Dept: CARDIOLOGY CLINIC | Age: 59
End: 2019-04-30

## 2019-04-30 VITALS
WEIGHT: 145 LBS | RESPIRATION RATE: 16 BRPM | BODY MASS INDEX: 23.3 KG/M2 | DIASTOLIC BLOOD PRESSURE: 56 MMHG | HEIGHT: 66 IN | SYSTOLIC BLOOD PRESSURE: 116 MMHG | HEART RATE: 84 BPM

## 2019-04-30 DIAGNOSIS — E78.01 FAMILIAL HYPERCHOLESTEREMIA: ICD-10-CM

## 2019-04-30 DIAGNOSIS — E03.4 HYPOTHYROIDISM DUE TO ACQUIRED ATROPHY OF THYROID: ICD-10-CM

## 2019-04-30 DIAGNOSIS — I25.10 CORONARY ARTERY DISEASE INVOLVING NATIVE CORONARY ARTERY OF NATIVE HEART WITHOUT ANGINA PECTORIS: ICD-10-CM

## 2019-04-30 DIAGNOSIS — N18.30 CKD (CHRONIC KIDNEY DISEASE), STAGE III (HCC): ICD-10-CM

## 2019-04-30 DIAGNOSIS — I25.5 ISCHEMIC CARDIOMYOPATHY: Primary | ICD-10-CM

## 2019-04-30 DIAGNOSIS — E10.22 TYPE 1 DIABETES MELLITUS WITH STAGE 3 CHRONIC KIDNEY DISEASE (HCC): ICD-10-CM

## 2019-04-30 DIAGNOSIS — R53.82 CHRONIC FATIGUE: ICD-10-CM

## 2019-04-30 DIAGNOSIS — N18.30 TYPE 1 DIABETES MELLITUS WITH STAGE 3 CHRONIC KIDNEY DISEASE (HCC): ICD-10-CM

## 2019-04-30 DIAGNOSIS — I73.9 PAD (PERIPHERAL ARTERY DISEASE) (HCC): ICD-10-CM

## 2019-04-30 DIAGNOSIS — F17.210 CIGARETTE NICOTINE DEPENDENCE WITHOUT COMPLICATION: ICD-10-CM

## 2019-04-30 NOTE — PROGRESS NOTES
History of Present Illness  Marlon Carnes is a 60 y.o. female. Last seen 6 months ago. Problem List  Date Reviewed: 4/8/2019          Codes Class Noted    Cigarette nicotine dependence without complication OSJ-55-EE: T99.053  ICD-9-CM: 305.1  4/30/2019        Familial hypercholesteremia ICD-10-CM: E78.01  ICD-9-CM: 272.0  10/17/2018        Dyslipidemia ICD-10-CM: E78.5  ICD-9-CM: 272.4  5/31/2016        Hypothyroidism ICD-10-CM: E03.9  ICD-9-CM: 244.9  2/23/2016        Ischemic cardiomyopathy ICD-10-CM: I25.5  ICD-9-CM: 414.8  11/2/2014        CAD (coronary artery disease), native coronary artery (Chronic) ICD-10-CM: I25.10  ICD-9-CM: 414.01  11/2/2014        PAD (peripheral artery disease) (HCC) (Chronic) ICD-10-CM: I73.9  ICD-9-CM: 443.9  10/6/2014        DM type 1 causing renal disease (HCC) (Chronic) ICD-10-CM: E10.29  ICD-9-CM: 250.41  10/6/2014        CKD (chronic kidney disease), stage III (HCC) (Chronic) ICD-10-CM: N18.3  ICD-9-CM: 585.3  9/25/2014        Anemia, iron deficiency ICD-10-CM: D50.9  ICD-9-CM: 280.9  9/25/2014        COPD (chronic obstructive pulmonary disease) ([facility] ICD-10-CM: J44.9  ICD-9-CM: 496  12/17/2009            Cardiac Testing  ECHO: 9/22/2014: EF 40%, mild diffuse HK, mild MR, TR   Cath 10/14/2014 - EDP 3, no AV gradient, LVEF 40-45%, inferior AK, mod cor Ca2+, LM mild plaque, LAD mild prox plaque, apical 85%, D1 diffuse 75%, LCX mid 90% involving distal OM, RCA prox 100% with L to R collaterals  Carotid duplex 11/5/14 - 0-9% left, 10-49% right  Angio 11/19/14 - patent stents on right with 3 vessel runoff, left mid SFA occlusion with 1 vessel runoff   11/23/15 Echo - akinesis of inferior and inferolateral walls, EF 30-35%  11/23/15 Carotid duplex - 0-9% right internal carotid, 10-49% left internal carotid. No significant change since 11/2014. Echo 2/19/16 - EF 40%. with mild lateral hypokinesis and severe posterior/inferior hypokinesis. Grade 1 diastolic dysfunction.  Mild LAE.   LHC 2/22/16 - Proximal LAD 40%, Mid LAD 50%, Distal LAD 90%. Echo 3/28/17 - EF 40 %. Akinesis of the basal-mid inferoseptal, basal-mid inferior, and basal-mid inferolateral wall(s). Mild MR. No significant change when compared to study 20-Feb-2016. Echo 6/6/18 - EF 35-40%. Severe HK of the basal-mid inferoseptal, basal-mid inferior, and basal-mid inferolateral wall(s). Mild MR. AoV sclerosis without stenosis. No change c/t study 28-Mar-2017. HPI  Ms. Audra Briones has been taking repatha for the past 4-5 mos without side effects. No updated blood work as of yet. Pt reports chronic fatigue. Pt has been smoking 1 pack of cigarettes every 3-4 days. Pt lives a relatively sedentary life. Pt enjoys playing with her 3 small dogs. Pt states her DM is fair. Pt is active in her community with service work. Pt denies feelings of depression. Pt reports she has not seen Dr. Dimas Yancey or Dr. Devi Desai in [place]. Patient denies any exertional chest pain, dyspnea, palpitations, syncope, orthopnea, edema or paroxysmal nocturnal dyspnea. Current Outpatient Medications   Medication Sig    levothyroxine (SYNTHROID) 175 mcg tablet TAKE 1 TABLET BY MOUTH EVERY DAY    REPATHA PUSHTRONEX 420 mg/3.5 mL Injt INJECT CONTENTS OF 1 CARTRIDGE (420MG) SUBCUTANEOUSLY ONCE EVERY 30 DAYS    NOVOLOG U-100 INSULIN ASPART 100 unit/mL injection USE 40 TO 60 UNITS AS DIRECTED VIA PUMP DAILY. MUST MAKE APPT    lisinopril (PRINIVIL, ZESTRIL) 5 mg tablet Take 2 Tabs by mouth daily.  RANEXA 500 mg SR tablet Take 1 tablet by mouth twice daily.  carvedilol (COREG) 6.25 mg tablet Take 1 tablet by mouth every morning. Take 2 tablets by mouth every evening.  sacubitril-valsartan (ENTRESTO) 49 mg/51 mg tablet Take 1 Tab by mouth two (2) times a day.  rosuvastatin (CRESTOR) 10 mg tablet Take 1 Tab by mouth nightly for 90 days.  cholecalciferol, vitamin D3, (VITAMIN D3) 2,000 unit tab Take  by mouth daily.     insulin lispro (HUMALOG) 100 unit/mL injection 1 Units by SubCUTAneous route continuous. To use for insulin pump.  glucose blood VI test strips (ASCENSIA CONTOUR) strip To test daily blood sugars.  nitroglycerin (NITROSTAT) 0.4 mg SL tablet 1 Tab by SubLINGual route every five (5) minutes as needed for Chest Pain. Indications: ANGINA    aspirin 81 mg chewable tablet Take 81 mg by mouth daily.   insulin pump (PATIENT SUPPLIED) misc as needed. Humalog insulin pump basal + bolus    doxycycline (MONODOX) 100 mg capsule Take 1 Cap by mouth two (2) times a day.  guaiFENesin-dextromethorphan SR (MUCINEX DM) 600-30 mg per tablet Take 1 Tab by mouth every twelve (12) hours as needed for Cough.  HYDROcodone-acetaminophen (NORCO) 5-325 mg per tablet Take 1 Tab by mouth every six (6) hours as needed for Pain. Max Daily Amount: 4 Tabs. No current facility-administered medications for this visit.       Social History     Socioeconomic History    Marital status:      Spouse name: Not on file    Number of children: Not on file    Years of education: Not on file    Highest education level: Not on file   Occupational History    Not on file   Social Needs    Financial resource strain: Not on file    Food insecurity:     Worry: Not on file     Inability: Not on file    Transportation needs:     Medical: Not on file     Non-medical: Not on file   Tobacco Use    Smoking status: Former Smoker     Packs/day: 0.25     Years: 35.00     Pack years: 8.75     Types: Cigarettes    Smokeless tobacco: Never Used   Substance and Sexual Activity    Alcohol use: No    Drug use: No    Sexual activity: Never   Lifestyle    Physical activity:     Days per week: Not on file     Minutes per session: Not on file    Stress: Not on file   Relationships    Social connections:     Talks on phone: Not on file     Gets together: Not on file     Attends Restorationism service: Not on file     Active member of club or organization: Not on file     Attends meetings of clubs or organizations: Not on file     Relationship status: Not on file    Intimate partner violence:     Fear of current or ex partner: Not on file     Emotionally abused: Not on file     Physically abused: Not on file     Forced sexual activity: Not on file   Other Topics Concern    Not on file   Social History Narrative    Not on file     Review of Systems  Constitutional: Negative for fever, chills, and diaphoresis. Positive for fatigue; unchanged. Respiratory: Negative for cough, hemoptysis, sputum production, shortness of breath and wheezing. Cardiovascular: Negative for chest pain, palpitations, orthopnea, leg swelling and PND. Gastrointestinal: Negative for heartburn, blood in stool and melena. Genitourinary: Negative for dysuria and flank pain. Musculoskeletal: Negative for joint pain and back pain. Skin: Negative for rash. Neurological: Negative for focal weakness, seizures, loss of consciousness, weakness and headaches. Endo/Heme/Allergies: Does not bruise/bleed easily. Psychiatric/Behavioral: Negative for memory loss. Visit Vitals  /56 (BP 1 Location: Left arm)   Pulse 84   Resp 16   Ht 5' 6\" (1.676 m)   Wt 145 lb (65.8 kg)   BMI 23.40 kg/m²     Wt Readings from Last 3 Encounters:   04/30/19 145 lb (65.8 kg)   04/08/19 149 lb 6.4 oz (67.8 kg)   02/11/19 146 lb (66.2 kg)     Lab Results   Component Value Date/Time    Cholesterol, total 609 (HH) 09/18/2018 03:08 PM    HDL Cholesterol 104 09/18/2018 03:08 PM    LDL, calculated 459 (H) 09/18/2018 03:08 PM    VLDL, calculated 46 (H) 09/18/2018 03:08 PM    Triglyceride 228 (H) 09/18/2018 03:08 PM    CHOL/HDL Ratio 2.4 02/20/2016 08:25 AM     Physical Exam  General - well developed well nourished  Neck - JVP normal, thyroid normal  Cardiac - normal S1,S2, no murmurs, rubs or gallops.  No clicks  Vascular - carotids without bruits, radials, femorals and pedal pulses equal bilateral  Lungs - clear to auscultation bilaterals, no rales, wheezing or rhonchi  Abd - soft nontender  Extremities - no edema, warm. Skin - no rash  Neuro - nonfocal  Psych - normal mood and affect    Cardiographics  EKG 10/6/14 - Sinus at 90 bpm, nonspecific ST changes  EKG 10/30/14 - sinus, normal EKG  EKG 2/15/17 -   EKG 6/6/18 - SR 83    Lab Results   Component Value Date/Time    Sodium 141 09/18/2018 03:08 PM    Potassium 4.6 09/18/2018 03:08 PM    Chloride 101 09/18/2018 03:08 PM    CO2 19 (L) 09/18/2018 03:08 PM    Anion gap 10 02/17/2017 02:10 AM    Glucose 123 (H) 09/18/2018 03:08 PM    BUN 45 (H) 09/18/2018 03:08 PM    Creatinine 1.74 (H) 09/18/2018 03:08 PM    BUN/Creatinine ratio 26 (H) 09/18/2018 03:08 PM    GFR est AA 36 (L) 09/18/2018 03:08 PM    GFR est non-AA 32 (L) 09/18/2018 03:08 PM    Calcium 11.2 (H) 09/18/2018 03:08 PM    Bilirubin, total 0.3 09/18/2018 03:08 PM    AST (SGOT) 38 09/18/2018 03:08 PM    Alk. phosphatase 92 09/18/2018 03:08 PM    Protein, total 7.5 09/18/2018 03:08 PM    Albumin 4.8 09/18/2018 03:08 PM    Globulin 3.9 02/16/2017 10:14 PM    A-G Ratio 1.8 09/18/2018 03:08 PM    ALT (SGPT) 28 09/18/2018 03:08 PM     ASSESSMENT and PLAN  Encounter Diagnoses   Name Primary?  Ischemic cardiomyopathy Yes    Coronary artery disease involving native coronary artery of native heart without angina pectoris     Type 1 diabetes mellitus with stage 3 chronic kidney disease (HCC)     Familial hypercholesteremia     CKD (chronic kidney disease), stage III ([address] [facility]     Hypothyroidism due to acquired atrophy of thyroid     PAD (peripheral artery disease) (HCC)     Chronic fatigue     Cigarette nicotine dependence without complication      Ms. Gary Cotter has diffuse atherosclerosis involving her coronary, carotid, and lower extremity circulation in the setting of T1DM. She has multivessel CAD with NSTEMI 2/19/16. She is felt to be best suited for continued medical therapy.  She has derived benefit from Ranexa 1000 mg BID. No anginal symptoms at her current functional capacity. Continue current regimen. Ischemic cardiomyopathy with moderate LV dysfunction, most recently by echo 6/2018 (EF 35-40%). She has class 2 HF on optimal medical therapy. Continue current doses of entresto and cavedilol. Reassess LV function with Echo in 6 mos. Chronic left leg claudication in the setting of diffuse PAD involving the left iliac circulation. Continue medical therapy. I encouraged her to stay smoke free, continue ASA and to exercise regularly. 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 She has been on Repatha for at least 4 months. Will check lipids with NMR in the near future. Will also check A1c, CMP, and TSH. She has not seen Dr. John Wade or Dr. David Juarez in quite some time. Nicotine dependence. She is not committed to quitting at this time. Chronic fatigue, likely multifactorial cause. Encouraged her to exercise. Follow-up and Dispositions    · Return in about 6 months (around 10/30/2019). RTC 6 months. Will follow up via phone with results. Written by Nalini Mojica, as dictated by Dr. Reina Santiago.    Reina Santiago MD

## 2019-04-30 NOTE — PROGRESS NOTES
Visit Vitals  /56 (BP 1 Location: Left arm)   Pulse 84   Resp 16   Ht 5' 6\" (1.676 m)   Wt 145 lb (65.8 kg)   BMI 23.40 kg/m²       Patient is here for follow up. States she is doing about the same.

## 2019-04-30 NOTE — LETTER
4/30/19 Patient: Kylah Barakat YOB: 1960 Date of Visit: 4/30/2019 Betzy Grove MD 
[address] [id number] VIA In Basket Dear Betzy Grove MD, Thank you for referring Ms. Daisy Parra to [facility] for evaluation. My notes for this consultation are attached. If you have questions, please do not hesitate to call me. I look forward to following your patient along with you. Sincerely, Alyson Blakely MD

## 2019-05-20 DIAGNOSIS — I25.5 ISCHEMIC CARDIOMYOPATHY: ICD-10-CM

## 2019-05-20 RX ORDER — LISINOPRIL 5 MG/1
TABLET ORAL
Qty: 90 TAB | Refills: 0 | Status: SHIPPED | OUTPATIENT
Start: 2019-05-20 | End: 2019-08-18 | Stop reason: SDUPTHER

## 2019-05-28 RX ORDER — EVOLOCUMAB 420 MG/3.5
KIT SUBCUTANEOUS
Qty: 4 CARTRIDGE | Refills: 0 | Status: SHIPPED | OUTPATIENT
Start: 2019-05-28 | End: 2019-07-01 | Stop reason: SDUPTHER

## 2019-06-12 LAB
ALBUMIN SERPL-MCNC: 4.3 G/DL (ref 3.5–5.5)
ALBUMIN/GLOB SERPL: 1.8 {RATIO} (ref 1.2–2.2)
ALP SERPL-CCNC: 96 IU/L (ref 39–117)
ALT SERPL-CCNC: 25 IU/L (ref 0–32)
AST SERPL-CCNC: 44 IU/L (ref 0–40)
BILIRUB SERPL-MCNC: <0.2 MG/DL (ref 0–1.2)
BUN SERPL-MCNC: 44 MG/DL (ref 6–24)
BUN/CREAT SERPL: 23 (ref 9–23)
CALCIUM SERPL-MCNC: 9.6 MG/DL (ref 8.7–10.2)
CHLORIDE SERPL-SCNC: 105 MMOL/L (ref 96–106)
CHOLEST SERPL-MCNC: 242 MG/DL (ref 100–199)
CO2 SERPL-SCNC: 21 MMOL/L (ref 20–29)
CREAT SERPL-MCNC: 1.89 MG/DL (ref 0.57–1)
EST. AVERAGE GLUCOSE BLD GHB EST-MCNC: 166 MG/DL
GLOBULIN SER CALC-MCNC: 2.4 G/DL (ref 1.5–4.5)
GLUCOSE SERPL-MCNC: 127 MG/DL (ref 65–99)
HBA1C MFR BLD: 7.4 % (ref 4.8–5.6)
HDL SERPL-SCNC: 35.8 UMOL/L
HDLC SERPL-MCNC: 73 MG/DL
INTERPRETATION, 910389: NORMAL
INTERPRETATION: NORMAL
LDL SERPL QN: 22.2 NM
LDL SERPL-SCNC: 1150 NMOL/L
LDL SMALL SERPL-SCNC: <90 NMOL/L
LDLC SERPL CALC-MCNC: 129 MG/DL (ref 0–99)
LP-IR SCORE SERPL: <25
Lab: NORMAL
PDF IMAGE, 910387: NORMAL
POTASSIUM SERPL-SCNC: 4.8 MMOL/L (ref 3.5–5.2)
PROT SERPL-MCNC: 6.7 G/DL (ref 6–8.5)
SODIUM SERPL-SCNC: 142 MMOL/L (ref 134–144)
TRIGL SERPL-MCNC: 202 MG/DL (ref 0–149)
TSH SERPL DL<=0.005 MIU/L-ACNC: 63.55 UIU/ML (ref 0.45–4.5)

## 2019-06-18 ENCOUNTER — TELEPHONE (OUTPATIENT)
Dept: CARDIOLOGY CLINIC | Age: 59
End: 2019-06-18

## 2019-06-18 NOTE — TELEPHONE ENCOUNTER
----- Message from Pete Jensen NP sent at 6/12/2019  1:42 PM EDT -----  Please notify Ms. Arzella Goodell that her thyroid function studies remain very abnormal. Please fax these to Dr. Tao Blackmon, her Endocrinologist, and have her follow up with them ASAP.       We will see her next month to review the rest.   ----- Message -----  From: Danitza Combs Lab Results In  Sent: 6/12/2019   1:36 PM  To: Missy Ayers MD

## 2019-07-25 ENCOUNTER — OFFICE VISIT (OUTPATIENT)
Dept: CARDIOLOGY CLINIC | Age: 59
End: 2019-07-25

## 2019-07-25 VITALS
HEART RATE: 76 BPM | HEIGHT: 66 IN | WEIGHT: 151 LBS | OXYGEN SATURATION: 98 % | BODY MASS INDEX: 24.27 KG/M2 | DIASTOLIC BLOOD PRESSURE: 70 MMHG | SYSTOLIC BLOOD PRESSURE: 122 MMHG

## 2019-07-25 DIAGNOSIS — E03.4 HYPOTHYROIDISM DUE TO ACQUIRED ATROPHY OF THYROID: ICD-10-CM

## 2019-07-25 DIAGNOSIS — N18.30 TYPE 1 DIABETES MELLITUS WITH STAGE 3 CHRONIC KIDNEY DISEASE (HCC): ICD-10-CM

## 2019-07-25 DIAGNOSIS — I73.9 PAD (PERIPHERAL ARTERY DISEASE) (HCC): ICD-10-CM

## 2019-07-25 DIAGNOSIS — I25.5 ISCHEMIC CARDIOMYOPATHY: ICD-10-CM

## 2019-07-25 DIAGNOSIS — F17.210 CIGARETTE NICOTINE DEPENDENCE WITHOUT COMPLICATION: ICD-10-CM

## 2019-07-25 DIAGNOSIS — I50.22 CHRONIC SYSTOLIC HEART FAILURE (HCC): ICD-10-CM

## 2019-07-25 DIAGNOSIS — R53.82 CHRONIC FATIGUE: ICD-10-CM

## 2019-07-25 DIAGNOSIS — E10.22 TYPE 1 DIABETES MELLITUS WITH STAGE 3 CHRONIC KIDNEY DISEASE (HCC): ICD-10-CM

## 2019-07-25 DIAGNOSIS — I25.10 CORONARY ARTERY DISEASE INVOLVING NATIVE CORONARY ARTERY OF NATIVE HEART WITHOUT ANGINA PECTORIS: Primary | ICD-10-CM

## 2019-07-25 DIAGNOSIS — N18.30 CKD (CHRONIC KIDNEY DISEASE), STAGE III (HCC): Chronic | ICD-10-CM

## 2019-07-25 DIAGNOSIS — E78.01 FAMILIAL HYPERCHOLESTEREMIA: ICD-10-CM

## 2019-07-25 NOTE — LETTER
7/27/19 Patient: Germán Dear YOB: 1960 Date of Visit: 7/25/2019 Jose Armando Costa MD 
[address] [facility] [id number] VIA In Basket Dear Jose Armando Costa MD, Thank you for referring Ms. Melissa Bloom to [facility] for evaluation. My notes for this consultation are attached. If you have questions, please do not hesitate to call me. I look forward to following your patient along with you. Sincerely, Maureen Gaytan MD

## 2019-07-25 NOTE — PROGRESS NOTES
Norman Dupont [address], [address]  [address]  [place], [address] [facility]    Office [phone]  Fax [phone]  Cell [phone]     Imelda Thompson is a 60 y.o. female. Last seen by me 3 months ago. DIAGNOSES  Encounter Diagnoses     ICD-10-CM ICD-9-CM   1. Coronary artery disease involving native coronary artery of native heart without angina pectoris I25.10 414.01   2. Ischemic cardiomyopathy I25.5 414.8   3. Type 1 diabetes mellitus with stage 3 chronic kidney disease (HCC) E10.22 250.41    N18.3 585.3   4. Familial hypercholesteremia E78.01 272.0   5. Chronic fatigue R53.82 780.79   6. Cigarette nicotine dependence without complication X80.826 240.9   7. PAD (peripheral artery disease) ([facility]) I73.9 443.9   8. Hypothyroidism due to acquired atrophy of thyroid E03.4 244.8     246.8   9. CKD (chronic kidney disease), stage III ([facility]) N18.3 585.3   10. Chronic systolic heart failure ([facility]) I50.22 428.22       ASSESSMENT/PLAN    Diffuse atherosclerosis involving her coronary, carotid, and lower extremity circulation in the setting of T1DM. She has multivessel CAD with NSTEMI 2/19/16. She is felt to be best suited for continued medical therapy. She has derived benefit from Ranexa 1000 mg BID. No anginal symptoms at her current functional capacity. Continue current regimen. Ischemic cardiomyopathy with moderate LV dysfunction. Updated echo today demonstrates EF 40%, improved from echo 1 year ago. She has class 2 HF on optimal medical therapy. Continue current doses of entresto and cavedilol. Chronic left leg claudication in the setting of diffuse PAD involving the left iliac circulation. Continue medical therapy. I encouraged her to stay smoke free, continue ASA and to exercise regularly. 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 With the addition of Repatha, her LDL has dropped from 549 to 129. Continue Repatha plus Crestor.  Consider the addition of Zetia if her LDL remains elevated despite achieving euthyroid status. Her A1c is 7.4%, which is good for her. Recheck NMR, CMP, and A1c in 6 months    Nicotine dependence. She is not committed to quitting at this time. Chronic fatigue, likely multifactorial cause, most recently d/t hypothyroidism with TSH 64 on synthroid 175. Dr. Nancey Sandifer will be adjusting her dose in the near future    CKD stage 3. Recent GFR 29. We discussed nephrology referral at some point      Follow-up and Dispositions    · Return in about 6 months (around 1/25/2020). HPI    Negar Garcia reports she is feeling stressed. Her 's mother passed away recently and she has been driving back and forth to [place] to clean out the house. She says she stays tired all day, but pushes through it. She performs daily activities with no significant exertional sxs. Patient denies any exertional chest pain, dyspnea, palpitations, syncope, orthopnea, edema or paroxysmal nocturnal dyspnea. Cardiac testing  ECHO: 9/22/2014: EF 40%, mild diffuse HK, mild MR, TR   Cath 10/14/2014 - EDP 3, no AV gradient, LVEF 40-45%, inferior AK, mod cor Ca2+, LM mild plaque, LAD mild prox plaque, apical 85%, D1 diffuse 75%, LCX mid 90% involving distal OM, RCA prox 100% with L to R collaterals  Carotid duplex 11/5/14 - 0-9% left, 10-49% right  Angio 11/19/14 - patent stents on right with 3 vessel runoff, left mid SFA occlusion with 1 vessel runoff   11/23/15 Echo - akinesis of inferior and inferolateral walls, EF 30-35%  11/23/15 Carotid duplex - 0-9% right internal carotid, 10-49% left internal carotid. No significant change since 11/2014. Echo 2/19/16 - EF 40%. with mild lateral hypokinesis and severe posterior/inferior hypokinesis. Grade 1 diastolic dysfunction. Mild LAE. LHC 2/22/16 - Proximal LAD 40%, Mid LAD 50%, Distal LAD 90%. Echo 3/28/17 - EF 40 %. Akinesis of the basal-mid inferoseptal, basal-mid inferior, and basal-mid inferolateral wall(s). Mild MR.  No significant change when compared to study 20-Feb-2016. Echo 6/6/18 - EF 35-40%. Severe HK of the basal-mid inferoseptal, basal-mid inferior, and basal-mid inferolateral wall(s). Mild MR. AoV sclerosis without stenosis. No change c/t study 28-Mar-2017. Echo 7/25/19 - EF 40-45%, inferior HK    Current Outpatient Medications   Medication Sig    REPATHA PUSHTRONEX 420 mg/3.5 mL Injt INJECT CONTENTS OF 1 CARTRIDGE (420 MG) SUBCUTANEOUSLY ONCE EVERY 30 DAYS    ranolazine ER (RANEXA) 500 mg SR tablet Take 1 tablet by mouth twice daily.  carvedilol (COREG) 6.25 mg tablet Take 1 tablet by mouth every morning. Take 2 tablets by mouth every evening.  guaiFENesin-dextromethorphan SR (MUCINEX DM) 600-30 mg per tablet Take 1 Tab by mouth every twelve (12) hours as needed for Cough.  levothyroxine (SYNTHROID) 175 mcg tablet TAKE 1 TABLET BY MOUTH EVERY DAY    lisinopril (PRINIVIL, ZESTRIL) 5 mg tablet Take 2 Tabs by mouth daily.  sacubitril-valsartan (ENTRESTO) 49 mg/51 mg tablet Take 1 Tab by mouth two (2) times a day.  rosuvastatin (CRESTOR) 10 mg tablet Take 1 Tab by mouth nightly for 90 days.  cholecalciferol, vitamin D3, (VITAMIN D3) 2,000 unit tab Take  by mouth daily.  insulin lispro (HUMALOG) 100 unit/mL injection 1 Units by SubCUTAneous route continuous. To use for insulin pump.  nitroglycerin (NITROSTAT) 0.4 mg SL tablet 1 Tab by SubLINGual route every five (5) minutes as needed for Chest Pain. Indications: ANGINA    aspirin 81 mg chewable tablet Take 81 mg by mouth daily.   insulin pump (PATIENT SUPPLIED) misc as needed. Humalog insulin pump basal + bolus    lisinopril (PRINIVIL, ZESTRIL) 5 mg tablet Take 1 Tab by mouth daily for 90 days.  NOVOLOG U-100 INSULIN ASPART 100 unit/mL injection USE 40 TO 60 UNITS AS DIRECTED VIA PUMP DAILY. MUST MAKE APPT    HYDROcodone-acetaminophen (NORCO) 5-325 mg per tablet Take 1 Tab by mouth every six (6) hours as needed for Pain.  Max Daily Amount: 4 Tabs.  glucose blood VI test strips (ASCENSIA CONTOUR) strip To test daily blood sugars. No current facility-administered medications for this visit. Social History     Socioeconomic History    Marital status:      Spouse name: Not on file    Number of children: Not on file    Years of education: Not on file    Highest education level: Not on file   Occupational History    Not on file   Social Needs    Financial resource strain: Not on file    Food insecurity:     Worry: Not on file     Inability: Not on file    Transportation needs:     Medical: Not on file     Non-medical: Not on file   Tobacco Use    Smoking status: Former Smoker     Packs/day: 0.25     Years: 35.00     Pack years: 8.75     Types: Cigarettes    Smokeless tobacco: Never Used   Substance and Sexual Activity    Alcohol use: No    Drug use: No    Sexual activity: Never   Lifestyle    Physical activity:     Days per week: Not on file     Minutes per session: Not on file    Stress: Not on file   Relationships    Social connections:     Talks on phone: Not on file     Gets together: Not on file     Attends [employer] service: Not on file     Active member of club or organization: Not on file     Attends meetings of clubs or organizations: Not on file     Relationship status: Not on file    Intimate partner violence:     Fear of current or ex partner: Not on file     Emotionally abused: Not on file     Physically abused: Not on file     Forced sexual activity: Not on file   Other Topics Concern    Not on file   Social History Narrative    Not on file     Review of Systems  Constitutional: Negative for fever, chills, and diaphoresis. +fatigue; unchanged. Respiratory: Negative for cough, hemoptysis, sputum production, shortness of breath and wheezing. Cardiovascular: Negative for chest pain, palpitations, orthopnea, leg swelling and PND.    Gastrointestinal: Negative for heartburn, blood in stool and melena. Genitourinary: Negative for dysuria and flank pain. Musculoskeletal: Negative for joint pain and back pain. Skin: Negative for rash. Neurological: Negative for focal weakness, seizures, loss of consciousness, weakness and headaches. Endo/Heme/Allergies: Does not bruise/bleed easily. Psychiatric/Behavioral: Negative for memory loss. +stress     Visit Vitals  /70 (BP 1 Location: Left arm, BP Patient Position: Sitting)   Pulse 76   Ht 5' 6\" (1.676 m)   Wt 151 lb (68.5 kg)   SpO2 98%   BMI 24.37 kg/m²     Wt Readings from Last 3 Encounters:   07/25/19 151 lb (68.5 kg)   07/25/19 151 lb (68.5 kg)   04/30/19 145 lb (65.8 kg)     Lab Results   Component Value Date/Time    Cholesterol, total 609 (HH) 09/18/2018 03:08 PM    Cholesterol, Total 242 (H) 06/11/2019 09:50 AM    HDL Cholesterol 104 09/18/2018 03:08 PM    LDL, calculated 459 (H) 09/18/2018 03:08 PM    VLDL, calculated 46 (H) 09/18/2018 03:08 PM    Triglyceride 228 (H) 09/18/2018 03:08 PM    CHOL/HDL Ratio 2.4 02/20/2016 08:25 AM     Physical Exam  General - well developed well nourished  Neck - JVP normal, thyroid normal  Cardiac - normal S1,S2, no murmurs, rubs or gallops. No clicks  Vascular - carotids without bruits, radials, femorals and pedal pulses equal bilateral  Lungs - clear to auscultation bilaterals, no rales, wheezing or rhonchi  Abd - soft nontender  Extremities - no edema, warm.    Skin - no rash  Neuro - nonfocal  Psych - normal mood and affect    Cardiographics  EKG 10/6/14 - Sinus at 90 bpm, nonspecific ST changes  EKG 10/30/14 - sinus, normal EKG  EKG 2/15/17 -   EKG 6/6/18 - SR 83  EKG 7/25/19 - SR, isolated PVC  Echo 7/25/19 - EF 40-45%, inferior HK    Lab Results   Component Value Date/Time    Sodium 142 06/11/2019 09:50 AM    Potassium 4.8 06/11/2019 09:50 AM    Chloride 105 06/11/2019 09:50 AM    CO2 21 06/11/2019 09:50 AM    Anion gap 10 02/17/2017 02:10 AM    Glucose 127 (H) 06/11/2019 09:50 AM    BUN 44 (H) 06/11/2019 09:50 AM    Creatinine 1.89 (H) 06/11/2019 09:50 AM    BUN/Creatinine ratio 23 06/11/2019 09:50 AM    GFR est AA 33 (L) 06/11/2019 09:50 AM    GFR est non-AA 29 (L) 06/11/2019 09:50 AM    Calcium 9.6 06/11/2019 09:50 AM    Bilirubin, total <0.2 06/11/2019 09:50 AM    AST (SGOT) 44 (H) 06/11/2019 09:50 AM    Alk. phosphatase 96 06/11/2019 09:50 AM    Protein, total 6.7 06/11/2019 09:50 AM    Albumin 4.3 06/11/2019 09:50 AM    Globulin 3.9 02/16/2017 10:14 PM    A-G Ratio 1.8 06/11/2019 09:50 AM    ALT (SGPT) 25 06/11/2019 09:50 AM         Written by Live Parry, as dictated by Gianfranco Monique M.D.      Gianfranco Monique MD

## 2019-07-25 NOTE — PROGRESS NOTES
Patient says she has no cardiac complaints today  Visit Vitals  /70 (BP 1 Location: Left arm, BP Patient Position: Sitting)   Pulse 76   Ht 5' 6\" (1.676 m)   Wt 151 lb (68.5 kg)   SpO2 98%   BMI 24.37 kg/m²

## 2019-07-27 PROBLEM — I50.22 CHRONIC SYSTOLIC HEART FAILURE (HCC): Status: ACTIVE | Noted: 2019-07-27

## 2019-08-18 DIAGNOSIS — I25.5 ISCHEMIC CARDIOMYOPATHY: ICD-10-CM

## 2019-08-18 RX ORDER — LISINOPRIL 5 MG/1
TABLET ORAL
Qty: 90 TAB | Refills: 0 | Status: SHIPPED | OUTPATIENT
Start: 2019-08-18 | End: 2019-10-23

## 2019-09-12 DIAGNOSIS — E03.9 ACQUIRED HYPOTHYROIDISM: ICD-10-CM

## 2019-09-12 RX ORDER — LEVOTHYROXINE SODIUM 175 UG/1
TABLET ORAL
Qty: 90 TAB | Refills: 1 | Status: SHIPPED | OUTPATIENT
Start: 2019-09-12

## 2019-10-03 ENCOUNTER — TELEPHONE (OUTPATIENT)
Dept: CARDIOLOGY CLINIC | Age: 59
End: 2019-10-03

## 2019-10-11 RX ORDER — EVOLOCUMAB 420 MG/3.5
KIT SUBCUTANEOUS
Qty: 3 CARTRIDGE | Refills: 3 | Status: SHIPPED | OUTPATIENT
Start: 2019-10-11 | End: 2020-08-24 | Stop reason: ALTCHOICE

## 2019-10-23 RX ORDER — SACUBITRIL AND VALSARTAN 49; 51 MG/1; MG/1
TABLET, FILM COATED ORAL
Qty: 180 TAB | Refills: 3 | Status: SHIPPED | OUTPATIENT
Start: 2019-10-23 | End: 2021-12-16 | Stop reason: SDUPTHER

## 2019-11-14 DIAGNOSIS — I25.5 ISCHEMIC CARDIOMYOPATHY: ICD-10-CM

## 2019-11-14 RX ORDER — LISINOPRIL 5 MG/1
TABLET ORAL
Qty: 90 TAB | Refills: 1 | Status: SHIPPED | OUTPATIENT
Start: 2019-11-14 | End: 2020-08-24 | Stop reason: SDUPTHER

## 2020-01-27 ENCOUNTER — OFFICE VISIT (OUTPATIENT)
Dept: CARDIOLOGY CLINIC | Age: 60
End: 2020-01-27

## 2020-01-27 VITALS
DIASTOLIC BLOOD PRESSURE: 90 MMHG | SYSTOLIC BLOOD PRESSURE: 142 MMHG | OXYGEN SATURATION: 98 % | BODY MASS INDEX: 24.91 KG/M2 | RESPIRATION RATE: 15 BRPM | HEART RATE: 91 BPM | HEIGHT: 66 IN | WEIGHT: 155 LBS

## 2020-01-27 DIAGNOSIS — E10.22 TYPE 1 DIABETES MELLITUS WITH STAGE 3 CHRONIC KIDNEY DISEASE (HCC): ICD-10-CM

## 2020-01-27 DIAGNOSIS — N18.30 TYPE 1 DIABETES MELLITUS WITH STAGE 3 CHRONIC KIDNEY DISEASE (HCC): ICD-10-CM

## 2020-01-27 DIAGNOSIS — I50.22 CHRONIC SYSTOLIC HEART FAILURE (HCC): ICD-10-CM

## 2020-01-27 DIAGNOSIS — F17.210 CIGARETTE NICOTINE DEPENDENCE WITHOUT COMPLICATION: ICD-10-CM

## 2020-01-27 DIAGNOSIS — I25.811 CORONARY ARTERY DISEASE INVOLVING NATIVE ARTERY OF TRANSPLANTED HEART WITHOUT ANGINA PECTORIS: Primary | ICD-10-CM

## 2020-01-27 DIAGNOSIS — E78.01 FAMILIAL HYPERCHOLESTEREMIA: ICD-10-CM

## 2020-01-27 DIAGNOSIS — I25.5 ISCHEMIC CARDIOMYOPATHY: ICD-10-CM

## 2020-01-27 DIAGNOSIS — I73.9 PAD (PERIPHERAL ARTERY DISEASE) (HCC): Chronic | ICD-10-CM

## 2020-01-27 DIAGNOSIS — N18.30 CKD (CHRONIC KIDNEY DISEASE), STAGE III (HCC): ICD-10-CM

## 2020-01-27 RX ORDER — ROSUVASTATIN CALCIUM 10 MG/1
TABLET, COATED ORAL
Qty: 90 TAB | Refills: 3 | Status: SHIPPED | OUTPATIENT
Start: 2020-01-27

## 2020-01-27 NOTE — PROGRESS NOTES
Chief Complaint   Patient presents with    Coronary Artery Disease    Cardiomyopathy    CHF    Follow-up     6 month     Visit Vitals  /90 (BP 1 Location: Left arm, BP Patient Position: Sitting)   Pulse 91   Resp 15   Ht 5' 6\" (1.676 m)   Wt 155 lb (70.3 kg)   SpO2 98%   BMI 25.02 kg/m²     Patient presents to office with no complaints of pain, SOB, dizziness, or chest pain. No recent hospitalizations reported. Need refill on Crestor.      Maite Saha LPN

## 2020-01-27 NOTE — LETTER
1/27/20 Patient: Mary Gonzalez YOB: 1960 Date of Visit: 1/27/2020 Olivia Reddy MD 
[place] [address] [address] [facility] [address] VIA In Basket Dear Olivia Reddy MD, Thank you for referring Ms. Swapna Lei to [facility] for evaluation. My notes for this consultation are attached. If you have questions, please do not hesitate to call me. I look forward to following your patient along with you. Sincerely, Mitch Perez MD

## 2020-01-27 NOTE — PROGRESS NOTES
Norman Allen, [address]  [address] [place]  [place], [address] [facility]    Office [phone]  Fax [phone]  Cell [phone]      Olga Etienne is a 60 y.o. female. Last seen by me 6 months ago. DIAGNOSES  Encounter Diagnoses     ICD-10-CM ICD-9-CM   1. Coronary artery disease involving native artery of transplanted heart without angina pectoris I25.811 414.06   2. Type 1 diabetes mellitus with stage 3 chronic kidney disease (HCC) E10.22 250.41    N18.3 585.3   3. Ischemic cardiomyopathy I25.5 414.8   4. Familial hypercholesteremia E78.01 272.0   5. CKD (chronic kidney disease), stage III ([facility]) N18.3 585.3   6. Cigarette nicotine dependence without complication K61.368 364.3   7. PAD (peripheral artery disease) ([facility]) I73.9 443.9   8. Chronic systolic heart failure ([facility]) I50.22 428.22       ASSESSMENT/PLAN    Polyvascular disease involving her coronary, carotid, and lower extremity circulation in the setting of T1DM. She has multivessel CAD with NSTEMI 2/19/16. She is felt to be best suited for continued medical therapy. She has derived benefit from Ranexa 1000 mg BID. She has class 1 angina at her current functional capacity. - Continue current regimen. Ischemic cardiomyopathy with moderate LV dysfunction. Updated echo July 2019 demonstrates EF 40%, improved from echo 1 year ago. She has class 2 HF on optimal medical therapy. - Continue current doses of entresto and cavedilol.  - Reassess LV function with echo in 6 months    Chronic left leg claudication in the setting of diffuse PAD involving the left iliac circulation. Continue medical therapy. I encouraged her to stop smoking, continue ASA and to exercise regularly. 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 With the addition of Repatha, her LDL has dropped from 549 to 129. She had updated labs with Dr. Mina Porter recently, will track down results. Continue Repatha plus Crestor.  Consider the addition of Zetia if her LDL remains elevated despite achieving euthyroid status. Nicotine dependence. She is not committed to quitting at this time. CKD stage 3. Recent GFR 29. We discussed nephrology referral at some point. F/u in 6 months. Follow-up and Dispositions    · Return in about 6 months (around 7/27/2020). DENIS Taylor reports she has been fighting ear infections bilateral with tubes placed in both ears. Patient denies any exertional chest pain, dyspnea, palpitations, syncope, orthopnea, edema or paroxysmal nocturnal dyspnea. She has chronic claudication, variable onset. She says her recent A1c was 7.4%. She continues to smoke 1/3rd PPD. Cardiac testing  ECHO: 9/22/2014: EF 40%, mild diffuse HK, mild MR, TR   Cath 10/14/2014 - EDP 3, no AV gradient, LVEF 40-45%, inferior AK, mod cor Ca2+, LM mild plaque, LAD mild prox plaque, apical 85%, D1 diffuse 75%, LCX mid 90% involving distal OM, RCA prox 100% with L to R collaterals  Carotid duplex 11/5/14 - 0-9% left, 10-49% right  Angio 11/19/14 - patent stents on right with 3 vessel runoff, left mid SFA occlusion with 1 vessel runoff   11/23/15 Echo - akinesis of inferior and inferolateral walls, EF 30-35%  11/23/15 Carotid duplex - 0-9% right internal carotid, 10-49% left internal carotid. No significant change since 11/2014. Echo 2/19/16 - EF 40%. with mild lateral hypokinesis and severe posterior/inferior hypokinesis. Grade 1 diastolic dysfunction. Mild LAE. LHC 2/22/16 - Proximal LAD 40%, Mid LAD 50%, Distal LAD 90%. Echo 3/28/17 - EF 40 %. Akinesis of the basal-mid inferoseptal, basal-mid inferior, and basal-mid inferolateral wall(s). Mild MR. No significant change when compared to study 20-Feb-2016. Echo 6/6/18 - EF 35-40%. Severe HK of the basal-mid inferoseptal, basal-mid inferior, and basal-mid inferolateral wall(s). Mild MR. AoV sclerosis without stenosis. No change c/t study 28-Mar-2017.   Echo 7/25/19 - EF 40-45%, inferior HK    Current Outpatient Medications   Medication Sig    lisinopril (PRINIVIL, ZESTRIL) 5 mg tablet Take 1 Tab by mouth daily for 90 days.  ENTRESTO 49-51 mg tab tablet TAKE 1 TABLET BY MOUTH TWICE A DAY    REPATHA PUSHTRONEX 420 mg/3.5 mL Injt INJECT 420MG SUBCUTANEOUSLY ONCE EVERY 30 DAYS    levothyroxine (SYNTHROID) 175 mcg tablet TAKE 1 TABLET BY MOUTH EVERY DAY    ranolazine ER (RANEXA) 500 mg SR tablet Take 1 tablet by mouth twice daily.  carvedilol (COREG) 6.25 mg tablet Take 1 tablet by mouth every morning. Take 2 tablets by mouth every evening.  guaiFENesin-dextromethorphan SR (MUCINEX DM) 600-30 mg per tablet Take 1 Tab by mouth every twelve (12) hours as needed for Cough.  NOVOLOG U-100 INSULIN ASPART 100 unit/mL injection USE 40 TO 60 UNITS AS DIRECTED VIA PUMP DAILY. MUST MAKE APPT    rosuvastatin (CRESTOR) 10 mg tablet Take 1 Tab by mouth nightly for 90 days.  cholecalciferol, vitamin D3, (VITAMIN D3) 2,000 unit tab Take  by mouth daily.  insulin lispro (HUMALOG) 100 unit/mL injection 1 Units by SubCUTAneous route continuous. To use for insulin pump.  glucose blood VI test strips (ASCENSIA CONTOUR) strip To test daily blood sugars.  nitroglycerin (NITROSTAT) 0.4 mg SL tablet 1 Tab by SubLINGual route every five (5) minutes as needed for Chest Pain. Indications: ANGINA    aspirin 81 mg chewable tablet Take 81 mg by mouth daily.   insulin pump (PATIENT SUPPLIED) misc as needed. Humalog insulin pump basal + bolus    HYDROcodone-acetaminophen (NORCO) 5-325 mg per tablet Take 1 Tab by mouth every six (6) hours as needed for Pain. Max Daily Amount: 4 Tabs. No current facility-administered medications for this visit.       Social History     Socioeconomic History    Marital status:      Spouse name: Not on file    Number of children: Not on file    Years of education: Not on file    Highest education level: Not on file   Occupational History    Not on file   Social Needs    Financial resource strain: Not on file    Food insecurity:     Worry: Not on file     Inability: Not on file    Transportation needs:     Medical: Not on file     Non-medical: Not on file   Tobacco Use    Smoking status: Former Smoker     Packs/day: 0.25     Years: 35.00     Pack years: 8.75     Types: Cigarettes    Smokeless tobacco: Never Used   Substance and Sexual Activity    Alcohol use: No    Drug use: No    Sexual activity: Never   Lifestyle    Physical activity:     Days per week: Not on file     Minutes per session: Not on file    Stress: Not on file   Relationships    Social connections:     Talks on phone: Not on file     Gets together: Not on file     Attends Denominational service: Not on file     Active member of club or organization: Not on file     Attends meetings of clubs or organizations: Not on file     Relationship status: Not on file    Intimate partner violence:     Fear of current or ex partner: Not on file     Emotionally abused: Not on file     Physically abused: Not on file     Forced sexual activity: Not on file   Other Topics Concern    Not on file   Social History Narrative    Not on file     Review of Systems  Constitutional: Negative for fever, chills, and diaphoresis. +fatigue; unchanged. Respiratory: Negative for cough, hemoptysis, sputum production, shortness of breath and wheezing. Cardiovascular: Negative for chest pain, palpitations, orthopnea, leg swelling and PND. Gastrointestinal: Negative for heartburn, blood in stool and melena. Genitourinary: Negative for dysuria and flank pain. Musculoskeletal: Negative for joint pain and back pain. Skin: Negative for rash. Neurological: Negative for focal weakness, seizures, loss of consciousness, weakness and headaches. Endo/Heme/Allergies: Does not bruise/bleed easily. Psychiatric/Behavioral: Negative for memory loss.  +stress     Visit Vitals  /90 (BP 1 Location: Left arm, BP Patient Position: Sitting)   Pulse 91   Resp 15   Ht 5' 6\" (1.676 m)   Wt 155 lb (70.3 kg)   SpO2 98%   BMI 25.02 kg/m²     Wt Readings from Last 3 Encounters:   01/27/20 155 lb (70.3 kg)   07/25/19 151 lb (68.5 kg)   07/25/19 151 lb (68.5 kg)     Lab Results   Component Value Date/Time    Cholesterol, total 609 (HH) 09/18/2018 03:08 PM    Cholesterol, Total 242 (H) 06/11/2019 09:50 AM    HDL Cholesterol 104 09/18/2018 03:08 PM    LDL, calculated 459 (H) 09/18/2018 03:08 PM    VLDL, calculated 46 (H) 09/18/2018 03:08 PM    Triglyceride 228 (H) 09/18/2018 03:08 PM    CHOL/HDL Ratio 2.4 02/20/2016 08:25 AM     Physical Exam  General - well developed well nourished  Neck - JVP normal, thyroid normal  Cardiac - normal S1,S2, no murmurs, rubs or gallops. No clicks  Vascular - carotids without bruits, radials, femorals and pedal pulses equal bilateral  Lungs - clear to auscultation bilaterals, no rales, wheezing or rhonchi  Abd - soft nontender  Extremities - no edema, warm. Skin - no rash  Neuro - nonfocal  Psych - normal mood and affect    Cardiographics  EKG 10/6/14 - Sinus at 90 bpm, nonspecific ST changes  EKG 10/30/14 - sinus, normal EKG  EKG 2/15/17 -   EKG 6/6/18 - SR 83  EKG 7/25/19 - SR, isolated PVC  Echo 7/25/19 - EF 40-45%, inferior HK    Lab Results   Component Value Date/Time    Sodium 142 06/11/2019 09:50 AM    Potassium 4.8 06/11/2019 09:50 AM    Chloride 105 06/11/2019 09:50 AM    CO2 21 06/11/2019 09:50 AM    Anion gap 10 02/17/2017 02:10 AM    Glucose 127 (H) 06/11/2019 09:50 AM    BUN 44 (H) 06/11/2019 09:50 AM    Creatinine 1.89 (H) 06/11/2019 09:50 AM    BUN/Creatinine ratio 23 06/11/2019 09:50 AM    GFR est AA 33 (L) 06/11/2019 09:50 AM    GFR est non-AA 29 (L) 06/11/2019 09:50 AM    Calcium 9.6 06/11/2019 09:50 AM    Bilirubin, total <0.2 06/11/2019 09:50 AM    AST (SGOT) 44 (H) 06/11/2019 09:50 AM    Alk.  phosphatase 96 06/11/2019 09:50 AM    Protein, total 6.7 06/11/2019 09:50 AM Albumin 4.3 06/11/2019 09:50 AM    Globulin 3.9 02/16/2017 10:14 PM    A-G Ratio 1.8 06/11/2019 09:50 AM    ALT (SGPT) 25 06/11/2019 09:50 AM         Written by Angela Sherman, as dictated by Luciano Boo M.D.      Luciano Boo MD

## 2020-02-14 RX ORDER — CARVEDILOL 6.25 MG/1
TABLET ORAL
Qty: 180 TAB | Refills: 2 | Status: SHIPPED | OUTPATIENT
Start: 2020-02-14 | End: 2020-08-10

## 2020-05-14 DIAGNOSIS — I25.5 ISCHEMIC CARDIOMYOPATHY: ICD-10-CM

## 2020-05-14 RX ORDER — RANOLAZINE 500 MG/1
TABLET, EXTENDED RELEASE ORAL
Qty: 180 TAB | Refills: 2 | Status: SHIPPED | OUTPATIENT
Start: 2020-05-14 | End: 2021-02-03

## 2020-05-14 RX ORDER — LISINOPRIL 5 MG/1
TABLET ORAL
Qty: 90 TAB | Refills: 0 | OUTPATIENT
Start: 2020-05-14

## 2020-08-10 RX ORDER — CARVEDILOL 6.25 MG/1
TABLET ORAL
Qty: 180 TAB | Refills: 1 | Status: SHIPPED | OUTPATIENT
Start: 2020-08-10 | End: 2020-12-10

## 2020-08-24 ENCOUNTER — VIRTUAL VISIT (OUTPATIENT)
Dept: INTERNAL MEDICINE CLINIC | Age: 60
End: 2020-08-24
Payer: COMMERCIAL

## 2020-08-24 DIAGNOSIS — J44.9 CHRONIC OBSTRUCTIVE PULMONARY DISEASE, UNSPECIFIED COPD TYPE (HCC): ICD-10-CM

## 2020-08-24 DIAGNOSIS — I25.5 ISCHEMIC CARDIOMYOPATHY: ICD-10-CM

## 2020-08-24 DIAGNOSIS — E10.22 TYPE 1 DIABETES MELLITUS WITH STAGE 3 CHRONIC KIDNEY DISEASE (HCC): Primary | ICD-10-CM

## 2020-08-24 DIAGNOSIS — R53.83 OTHER FATIGUE: ICD-10-CM

## 2020-08-24 DIAGNOSIS — E03.9 ACQUIRED HYPOTHYROIDISM: ICD-10-CM

## 2020-08-24 DIAGNOSIS — N18.30 TYPE 1 DIABETES MELLITUS WITH STAGE 3 CHRONIC KIDNEY DISEASE (HCC): Primary | ICD-10-CM

## 2020-08-24 DIAGNOSIS — E03.4 HYPOTHYROIDISM DUE TO ACQUIRED ATROPHY OF THYROID: ICD-10-CM

## 2020-08-24 PROCEDURE — 99214 OFFICE O/P EST MOD 30 MIN: CPT | Performed by: INTERNAL MEDICINE

## 2020-08-24 RX ORDER — LISINOPRIL 5 MG/1
5 TABLET ORAL DAILY
Qty: 90 TAB | Refills: 1 | Status: SHIPPED | OUTPATIENT
Start: 2020-08-24 | End: 2021-03-26

## 2020-08-24 NOTE — PROGRESS NOTES
HISTORY OF PRESENT ILLNESS  Joselin Cook is a 63 y.o. female who is present, aware, and consenting for real-time synchronous virtual video visit through [employer]. HPI  hyperthyroidism. Lab Results   Component Value Date/Time    TSH 63.550 (H) 06/11/2019 09:50 AM     Thyroid ROS: denies fatigue, weight changes, heat/cold intolerance, bowel/skin changes or CVS symptoms. Followed by Dr. Laina Weems. Continues on levothyroxine. Diabetic Review of Systems - further diabetic ROS: no polyuria or polydipsia, no chest pain, dyspnea or TIA's, no numbness, tingling or pain in extremities. Other symptoms and concerns: Pt notes that she has not had her labs done since the end of 2019. Pt reports that she has been especially sedentary. Ischemic cardiomyopathy: Followed by Dr. Annamarie Fournier. Stable, pt continues to comply with Lisinopril. Pt reports monitoring BP at home at reports values averaging 130/70. Denies CP or SOB. Fatigue: Pt reports experiencing fatigue. COPD: Stable with no noted flares. Health maintenance: Pt reports that she is not comfortable getting a mammogram or colonoscopy amid COVID conditions. Pt reports that she had to have tubes put in her ear during [place] time. Review of Systems   Constitutional: Positive for malaise/fatigue. All other systems reviewed and are negative. Physical Exam  Vitals signs reviewed. Constitutional:       General: She is not in acute distress. Appearance: Normal appearance. She is not ill-appearing, toxic-appearing or diaphoretic. HENT:      Right Ear: Hearing normal.      Left Ear: Hearing normal.      Nose: Nose normal.      Mouth/Throat:      Mouth: Mucous membranes are moist.      Pharynx: Oropharynx is clear. Eyes:      Conjunctiva/sclera: Conjunctivae normal.   Neck:      Musculoskeletal: Normal range of motion. Pulmonary:      Effort: No respiratory distress. Breath sounds: Normal air entry.    Musculoskeletal: Normal range of motion. Skin:     General: Skin is warm and dry. Neurological:      General: No focal deficit present. Mental Status: She is alert and oriented to person, place, and time. Mental status is at baseline. Psychiatric:         Mood and Affect: Mood normal.         Behavior: Behavior normal.         Thought Content: Thought content normal.         Judgment: Judgment normal.         ASSESSMENT and PLAN  Diagnoses and all orders for this visit:    1. Type 1 diabetes mellitus with stage 3 chronic kidney disease ([facility]  Sugars presumed stable. Continue to follow diabetic diet and monitor sugars. 2. Acquired hypothyroidism    3. Hypothyroidism due to acquired atrophy of thyroid  Thyroid presumed stable. Pt tolerating medication. I do not recommend a change in treatment plan. Informed pt that she needs to do labs with Dr. Tanja Castillo to determine the best dosing of her medication. 4. Ischemic cardiomyopathy  Stable and well-managed. No change in medications. Had discussion about the importance of exercise and healthy diet on pt's health. -     lisinopriL (PRINIVIL, ZESTRIL) 5 mg tablet; Take 1 Tab by mouth daily. 5. Other fatigue  Presumed stable. Had conversation with pt that without recent labs or physical examination, it is difficult to determine the source of her fatigue. Encouraged pt to eat balanced meals and work on exercising more. Will continue to monitor for improvements or changes. 6. Chronic obstructive pulmonary disease, unspecified COPD type ([facility]  Stable and well-managed. No change in medications. Lab results and schedule of future lab studies reviewed with patient. Reviewed diet, exercise and weight control. Written by Reena Sorto, as dictated by Mabel Cordon MD.     Current diagnosis and concerns discussed with pt at length.  Understands risks and benefits or current treatment plan and medications and accepts the treatment and medication with any possible risks. Pt asks appropriate questions which were answered. Pt instructed to call with any concerns or problems.

## 2020-12-10 RX ORDER — CARVEDILOL 6.25 MG/1
TABLET ORAL
Qty: 180 TAB | Refills: 0 | Status: SHIPPED | OUTPATIENT
Start: 2020-12-10 | End: 2021-02-03

## 2021-01-26 ENCOUNTER — PATIENT MESSAGE (OUTPATIENT)
Dept: INTERNAL MEDICINE CLINIC | Age: 61
End: 2021-01-26

## 2021-11-19 LAB — HBA1C MFR BLD HPLC: 8.6 %

## 2021-12-16 ENCOUNTER — OFFICE VISIT (OUTPATIENT)
Dept: INTERNAL MEDICINE CLINIC | Age: 61
End: 2021-12-16
Payer: COMMERCIAL

## 2021-12-16 VITALS
HEIGHT: 66 IN | RESPIRATION RATE: 16 BRPM | TEMPERATURE: 97.5 F | HEART RATE: 79 BPM | BODY MASS INDEX: 24.75 KG/M2 | SYSTOLIC BLOOD PRESSURE: 138 MMHG | OXYGEN SATURATION: 99 % | WEIGHT: 154 LBS | DIASTOLIC BLOOD PRESSURE: 79 MMHG

## 2021-12-16 DIAGNOSIS — N18.32 TYPE 1 DIABETES MELLITUS WITH STAGE 3B CHRONIC KIDNEY DISEASE (HCC): Primary | ICD-10-CM

## 2021-12-16 DIAGNOSIS — E03.9 ACQUIRED HYPOTHYROIDISM: ICD-10-CM

## 2021-12-16 DIAGNOSIS — J44.9 CHRONIC OBSTRUCTIVE PULMONARY DISEASE, UNSPECIFIED COPD TYPE (HCC): ICD-10-CM

## 2021-12-16 DIAGNOSIS — I25.5 ISCHEMIC CARDIOMYOPATHY: ICD-10-CM

## 2021-12-16 DIAGNOSIS — E10.42 TYPE 1 DIABETES MELLITUS WITH DIABETIC POLYNEUROPATHY (HCC): ICD-10-CM

## 2021-12-16 DIAGNOSIS — E10.22 TYPE 1 DIABETES MELLITUS WITH STAGE 3B CHRONIC KIDNEY DISEASE (HCC): Primary | ICD-10-CM

## 2021-12-16 DIAGNOSIS — I73.9 PAD (PERIPHERAL ARTERY DISEASE) (HCC): ICD-10-CM

## 2021-12-16 PROCEDURE — 99214 OFFICE O/P EST MOD 30 MIN: CPT | Performed by: INTERNAL MEDICINE

## 2021-12-16 RX ORDER — DULOXETIN HYDROCHLORIDE 30 MG/1
30 CAPSULE, DELAYED RELEASE ORAL DAILY
COMMUNITY
Start: 2021-12-15

## 2021-12-16 RX ORDER — CARVEDILOL 6.25 MG/1
TABLET ORAL
Qty: 180 TABLET | Refills: 0 | Status: SHIPPED | OUTPATIENT
Start: 2021-12-16 | End: 2022-01-07

## 2021-12-16 RX ORDER — SACUBITRIL AND VALSARTAN 49; 51 MG/1; MG/1
1 TABLET, FILM COATED ORAL 2 TIMES DAILY
Qty: 180 TABLET | Refills: 3 | Status: SHIPPED | OUTPATIENT
Start: 2021-12-16

## 2021-12-16 RX ORDER — LISINOPRIL 5 MG/1
5 TABLET ORAL DAILY
Qty: 90 TABLET | Refills: 1 | Status: SHIPPED | OUTPATIENT
Start: 2021-12-16

## 2021-12-16 NOTE — PROGRESS NOTES
Jacki Fuentes (: 1960) is a 57 y.o. female, established patient, here for evaluation of the following chief complaint(s):  Follow-up (refill meds, diabetes)       ASSESSMENT/PLAN:  Below is the assessment and plan developed based on review of pertinent history, physical exam, labs, studies, and medications. 1. Type 1 diabetes mellitus with stage 3b chronic kidney disease (HCC)  BG presumed stable, followed by Dr. Sultana Coughlin. I will call endocrinologist office to obtain her recent blood work. Continue with ongoing regimen of Novolog and humalog. 2. Ischemic cardiomyopathy  -     lisinopriL (PRINIVIL, ZESTRIL) 5 mg tablet; Take 1 Tablet by mouth daily. , Normal, Disp-90 Tablet, R-1Needs appt  -     carvediloL (COREG) 6.25 mg tablet; Take 1 tablet by mouth every morning, and take 2 tablets by mouth every evening., Normal, Disp-180 Tablet, R-0Requesting return office visit prior to additional refills. -     sacubitriL-valsartan (Entresto) 49-51 mg tab tablet; Take 1 Tablet by mouth two (2) times a day., Normal, Disp-180 Tablet, R-3  I encouraged the pt to establish with another cardiologist in Dr. Michael miranda practice. I will refill her Lisinopril, Coreg and Entresto until she can be seen by cardiology. 3. Acquired hypothyroidism  Thyroid presumed stable, followed by Dr. Sultana Coughlin. I will call endocrinologist office to obtain her recent blood work. Continue with ongoing regimen of Synthroid. 4. Chronic obstructive pulmonary disease, unspecified COPD type ([address]  Stable without inhalers. 5. PAD (peripheral artery disease) ([facility])  Stable and well-managed. Continue with ongoing regimen of ASA. 6. Type 1 diabetes mellitus with diabetic polyneuropathy ([address]  Not at goal, managed by Dr. Sultana Coughlin. Continue with ongoing regimen of Cymbalta. No follow-ups on file.      SUBJECTIVE/OBJECTIVE:  HPI    Ischemic cardiomyopathy: Pt expresses interest in seeing someone in Dr. Kerrie Dupree old [facility] since he has left the practice. She notes that she has been out of her regimen for 3 weeks and denies angina. hypothyroidism. Lab Results   Component Value Date/Time    TSH 63.550 (H) 06/11/2019 09:50 AM   Thyroid ROS: denies fatigue, weight changes, heat/cold intolerance, bowel/skin changes or CVS symptoms. She is followed by Dr. Luca Patiño, who she saw on 11/18/21. Diabetic Review of Systems - medication compliance: compliant all of the time. Other symptoms and concerns: Pt's last a1c was 9 on 9/18/18 with an estimated BG of 212. She is followed by Dr. Luca Patiño, who she saw on 11/18/21. COPD: Pt denies inhaler use at this time. Neuropathy: Pt reports that the neuropathy in her legs is interrupting her sleeps. She denies relief from Lyrica and could not tolerate gabapentin. Congestion: Pt notes constant post-nasal drip since having her teeth fixed in May 2021. Other: Pt denies interest in having a mammogram. She intends to schedule a f/u with Dr. Thai Singleton.     Review of Systems   HENT: Positive for postnasal drip. All other systems reviewed and are negative. Physical Exam  Constitutional:       Appearance: Normal appearance. HENT:      Right Ear: Tympanic membrane and external ear normal.      Left Ear: Tympanic membrane and external ear normal.      Mouth/Throat:      Mouth: Mucous membranes are moist.      Pharynx: Oropharynx is clear. Cardiovascular:      Rate and Rhythm: Normal rate and regular rhythm. Pulses: Normal pulses. Heart sounds: Normal heart sounds. Pulmonary:      Effort: Pulmonary effort is normal.      Breath sounds: Normal breath sounds. Musculoskeletal:         General: Normal range of motion. Skin:     General: Skin is warm and dry. Neurological:      General: No focal deficit present. Mental Status: She is alert and oriented to person, place, and time.    Psychiatric:         Mood and Affect: Mood normal.         Behavior: Behavior normal.     On this date 12/16/2021 I have spent 35 minutes reviewing previous notes, test results and face to face with the patient discussing the diagnosis and importance of compliance with the treatment plan as well as documenting on the day of the visit. An electronic signature was used to authenticate this note. Written by Reina Tsang as dictated by Dr. Rosmery Cook.    -- Reina Tsang

## 2022-01-07 DIAGNOSIS — I25.5 ISCHEMIC CARDIOMYOPATHY: ICD-10-CM

## 2022-01-07 RX ORDER — CARVEDILOL 6.25 MG/1
TABLET ORAL
Qty: 90 TABLET | Refills: 1 | Status: SHIPPED | OUTPATIENT
Start: 2022-01-07 | End: 2022-02-11

## 2022-02-11 DIAGNOSIS — I25.5 ISCHEMIC CARDIOMYOPATHY: ICD-10-CM

## 2022-02-11 RX ORDER — CARVEDILOL 6.25 MG/1
TABLET ORAL
Qty: 90 TABLET | Refills: 1 | Status: SHIPPED | OUTPATIENT
Start: 2022-02-11

## 2022-03-18 PROBLEM — E78.01 FAMILIAL HYPERCHOLESTEREMIA: Status: ACTIVE | Noted: 2018-10-17

## 2022-03-19 PROBLEM — I50.22 CHRONIC SYSTOLIC HEART FAILURE (HCC): Status: ACTIVE | Noted: 2019-07-27

## 2022-03-19 PROBLEM — F17.210 CIGARETTE NICOTINE DEPENDENCE WITHOUT COMPLICATION: Status: ACTIVE | Noted: 2019-04-30
